# Patient Record
Sex: FEMALE | Race: WHITE | NOT HISPANIC OR LATINO | URBAN - METROPOLITAN AREA
[De-identification: names, ages, dates, MRNs, and addresses within clinical notes are randomized per-mention and may not be internally consistent; named-entity substitution may affect disease eponyms.]

---

## 2019-08-12 ENCOUNTER — OFFICE VISIT - HEALTHEAST (OUTPATIENT)
Dept: ADDICTION MEDICINE | Facility: CLINIC | Age: 59
End: 2019-08-12

## 2019-08-12 ENCOUNTER — AMBULATORY - HEALTHEAST (OUTPATIENT)
Dept: ADDICTION MEDICINE | Facility: CLINIC | Age: 59
End: 2019-08-12

## 2019-08-12 DIAGNOSIS — F10.20 ALCOHOL USE DISORDER, SEVERE, DEPENDENCE (H): ICD-10-CM

## 2019-08-13 ENCOUNTER — OFFICE VISIT - HEALTHEAST (OUTPATIENT)
Dept: ADDICTION MEDICINE | Facility: CLINIC | Age: 59
End: 2019-08-13

## 2019-08-13 DIAGNOSIS — F33.1 MAJOR DEPRESSIVE DISORDER, RECURRENT EPISODE, MODERATE (H): ICD-10-CM

## 2019-08-13 DIAGNOSIS — F10.20 ALCOHOL USE DISORDER, SEVERE, DEPENDENCE (H): ICD-10-CM

## 2019-08-13 DIAGNOSIS — F43.10 POSTTRAUMATIC STRESS DISORDER: ICD-10-CM

## 2019-08-14 ENCOUNTER — OFFICE VISIT - HEALTHEAST (OUTPATIENT)
Dept: ADDICTION MEDICINE | Facility: CLINIC | Age: 59
End: 2019-08-14

## 2019-08-14 DIAGNOSIS — F10.20 ALCOHOL USE DISORDER, SEVERE, DEPENDENCE (H): ICD-10-CM

## 2019-08-16 ENCOUNTER — OFFICE VISIT - HEALTHEAST (OUTPATIENT)
Dept: ADDICTION MEDICINE | Facility: CLINIC | Age: 59
End: 2019-08-16

## 2019-08-16 DIAGNOSIS — F10.20 ALCOHOL USE DISORDER, SEVERE, DEPENDENCE (H): ICD-10-CM

## 2019-08-19 ENCOUNTER — OFFICE VISIT - HEALTHEAST (OUTPATIENT)
Dept: ADDICTION MEDICINE | Facility: CLINIC | Age: 59
End: 2019-08-19

## 2019-08-19 DIAGNOSIS — F10.20 ALCOHOL USE DISORDER, SEVERE, DEPENDENCE (H): ICD-10-CM

## 2019-08-20 ENCOUNTER — AMBULATORY - HEALTHEAST (OUTPATIENT)
Dept: ADDICTION MEDICINE | Facility: CLINIC | Age: 59
End: 2019-08-20

## 2019-08-21 ENCOUNTER — OFFICE VISIT - HEALTHEAST (OUTPATIENT)
Dept: ADDICTION MEDICINE | Facility: CLINIC | Age: 59
End: 2019-08-21

## 2019-08-21 DIAGNOSIS — F10.20 ALCOHOL USE DISORDER, SEVERE, DEPENDENCE (H): ICD-10-CM

## 2019-08-22 ENCOUNTER — AMBULATORY - HEALTHEAST (OUTPATIENT)
Dept: ADDICTION MEDICINE | Facility: CLINIC | Age: 59
End: 2019-08-22

## 2019-08-26 ENCOUNTER — OFFICE VISIT - HEALTHEAST (OUTPATIENT)
Dept: ADDICTION MEDICINE | Facility: CLINIC | Age: 59
End: 2019-08-26

## 2019-08-26 DIAGNOSIS — F10.20 ALCOHOL USE DISORDER, SEVERE, DEPENDENCE (H): ICD-10-CM

## 2019-08-28 ENCOUNTER — OFFICE VISIT - HEALTHEAST (OUTPATIENT)
Dept: ADDICTION MEDICINE | Facility: CLINIC | Age: 59
End: 2019-08-28

## 2019-08-28 DIAGNOSIS — F10.20 ALCOHOL USE DISORDER, SEVERE, DEPENDENCE (H): ICD-10-CM

## 2019-08-30 ENCOUNTER — AMBULATORY - HEALTHEAST (OUTPATIENT)
Dept: ADDICTION MEDICINE | Facility: CLINIC | Age: 59
End: 2019-08-30

## 2019-08-30 ENCOUNTER — OFFICE VISIT - HEALTHEAST (OUTPATIENT)
Dept: ADDICTION MEDICINE | Facility: CLINIC | Age: 59
End: 2019-08-30

## 2019-08-30 DIAGNOSIS — F10.20 ALCOHOL USE DISORDER, SEVERE, DEPENDENCE (H): ICD-10-CM

## 2019-09-05 ENCOUNTER — AMBULATORY - HEALTHEAST (OUTPATIENT)
Dept: ADDICTION MEDICINE | Facility: CLINIC | Age: 59
End: 2019-09-05

## 2019-09-11 ENCOUNTER — OFFICE VISIT - HEALTHEAST (OUTPATIENT)
Dept: ADDICTION MEDICINE | Facility: CLINIC | Age: 59
End: 2019-09-11

## 2019-09-11 DIAGNOSIS — F10.20 ALCOHOL USE DISORDER, SEVERE, DEPENDENCE (H): ICD-10-CM

## 2019-09-12 ENCOUNTER — AMBULATORY - HEALTHEAST (OUTPATIENT)
Dept: ADDICTION MEDICINE | Facility: CLINIC | Age: 59
End: 2019-09-12

## 2019-09-13 ENCOUNTER — OFFICE VISIT - HEALTHEAST (OUTPATIENT)
Dept: ADDICTION MEDICINE | Facility: CLINIC | Age: 59
End: 2019-09-13

## 2019-09-13 DIAGNOSIS — F10.20 ALCOHOL USE DISORDER, SEVERE, DEPENDENCE (H): ICD-10-CM

## 2019-09-16 ENCOUNTER — OFFICE VISIT - HEALTHEAST (OUTPATIENT)
Dept: ADDICTION MEDICINE | Facility: CLINIC | Age: 59
End: 2019-09-16

## 2019-09-16 DIAGNOSIS — F10.20 ALCOHOL USE DISORDER, SEVERE, DEPENDENCE (H): ICD-10-CM

## 2019-09-17 ENCOUNTER — AMBULATORY - HEALTHEAST (OUTPATIENT)
Dept: ADDICTION MEDICINE | Facility: CLINIC | Age: 59
End: 2019-09-17

## 2019-09-18 ENCOUNTER — OFFICE VISIT - HEALTHEAST (OUTPATIENT)
Dept: ADDICTION MEDICINE | Facility: CLINIC | Age: 59
End: 2019-09-18

## 2019-09-18 DIAGNOSIS — F10.20 ALCOHOL USE DISORDER, SEVERE, DEPENDENCE (H): ICD-10-CM

## 2019-09-19 ENCOUNTER — AMBULATORY - HEALTHEAST (OUTPATIENT)
Dept: ADDICTION MEDICINE | Facility: CLINIC | Age: 59
End: 2019-09-19

## 2019-09-23 ENCOUNTER — OFFICE VISIT - HEALTHEAST (OUTPATIENT)
Dept: ADDICTION MEDICINE | Facility: CLINIC | Age: 59
End: 2019-09-23

## 2019-09-23 DIAGNOSIS — F33.1 MAJOR DEPRESSIVE DISORDER, RECURRENT EPISODE, MODERATE (H): ICD-10-CM

## 2019-09-23 DIAGNOSIS — F10.20 ALCOHOL USE DISORDER, SEVERE, DEPENDENCE (H): ICD-10-CM

## 2019-09-25 ENCOUNTER — OFFICE VISIT - HEALTHEAST (OUTPATIENT)
Dept: ADDICTION MEDICINE | Facility: CLINIC | Age: 59
End: 2019-09-25

## 2019-09-25 DIAGNOSIS — F10.20 ALCOHOL USE DISORDER, SEVERE, DEPENDENCE (H): ICD-10-CM

## 2019-09-26 ENCOUNTER — AMBULATORY - HEALTHEAST (OUTPATIENT)
Dept: ADDICTION MEDICINE | Facility: CLINIC | Age: 59
End: 2019-09-26

## 2019-09-27 ENCOUNTER — OFFICE VISIT - HEALTHEAST (OUTPATIENT)
Dept: ADDICTION MEDICINE | Facility: CLINIC | Age: 59
End: 2019-09-27

## 2019-09-27 DIAGNOSIS — F10.20 ALCOHOL USE DISORDER, SEVERE, DEPENDENCE (H): ICD-10-CM

## 2019-10-03 ENCOUNTER — AMBULATORY - HEALTHEAST (OUTPATIENT)
Dept: ADDICTION MEDICINE | Facility: CLINIC | Age: 59
End: 2019-10-03

## 2019-10-09 ENCOUNTER — OFFICE VISIT - HEALTHEAST (OUTPATIENT)
Dept: ADDICTION MEDICINE | Facility: CLINIC | Age: 59
End: 2019-10-09

## 2019-10-09 DIAGNOSIS — F10.20 ALCOHOL USE DISORDER, SEVERE, DEPENDENCE (H): ICD-10-CM

## 2019-10-10 ENCOUNTER — OFFICE VISIT - HEALTHEAST (OUTPATIENT)
Dept: ADDICTION MEDICINE | Facility: CLINIC | Age: 59
End: 2019-10-10

## 2019-10-10 ENCOUNTER — AMBULATORY - HEALTHEAST (OUTPATIENT)
Dept: ADDICTION MEDICINE | Facility: CLINIC | Age: 59
End: 2019-10-10

## 2019-10-10 DIAGNOSIS — F10.20 ALCOHOL USE DISORDER, SEVERE, DEPENDENCE (H): ICD-10-CM

## 2019-10-14 ENCOUNTER — OFFICE VISIT - HEALTHEAST (OUTPATIENT)
Dept: ADDICTION MEDICINE | Facility: CLINIC | Age: 59
End: 2019-10-14

## 2019-10-14 DIAGNOSIS — F10.20 ALCOHOL USE DISORDER, SEVERE, DEPENDENCE (H): ICD-10-CM

## 2019-10-16 ENCOUNTER — OFFICE VISIT - HEALTHEAST (OUTPATIENT)
Dept: ADDICTION MEDICINE | Facility: CLINIC | Age: 59
End: 2019-10-16

## 2019-10-16 DIAGNOSIS — F10.20 ALCOHOL USE DISORDER, SEVERE, DEPENDENCE (H): ICD-10-CM

## 2019-10-17 ENCOUNTER — AMBULATORY - HEALTHEAST (OUTPATIENT)
Dept: ADDICTION MEDICINE | Facility: CLINIC | Age: 59
End: 2019-10-17

## 2019-10-18 ENCOUNTER — OFFICE VISIT - HEALTHEAST (OUTPATIENT)
Dept: ADDICTION MEDICINE | Facility: CLINIC | Age: 59
End: 2019-10-18

## 2019-10-18 DIAGNOSIS — F10.20 ALCOHOL USE DISORDER, SEVERE, DEPENDENCE (H): ICD-10-CM

## 2019-10-24 ENCOUNTER — AMBULATORY - HEALTHEAST (OUTPATIENT)
Dept: ADDICTION MEDICINE | Facility: CLINIC | Age: 59
End: 2019-10-24

## 2019-10-30 ENCOUNTER — OFFICE VISIT - HEALTHEAST (OUTPATIENT)
Dept: ADDICTION MEDICINE | Facility: CLINIC | Age: 59
End: 2019-10-30

## 2019-10-30 DIAGNOSIS — F10.20 ALCOHOL USE DISORDER, SEVERE, DEPENDENCE (H): ICD-10-CM

## 2019-10-31 ENCOUNTER — AMBULATORY - HEALTHEAST (OUTPATIENT)
Dept: ADDICTION MEDICINE | Facility: CLINIC | Age: 59
End: 2019-10-31

## 2019-11-01 ENCOUNTER — OFFICE VISIT - HEALTHEAST (OUTPATIENT)
Dept: ADDICTION MEDICINE | Facility: CLINIC | Age: 59
End: 2019-11-01

## 2019-11-01 DIAGNOSIS — F10.20 ALCOHOL USE DISORDER, SEVERE, DEPENDENCE (H): ICD-10-CM

## 2019-11-07 ENCOUNTER — AMBULATORY - HEALTHEAST (OUTPATIENT)
Dept: ADDICTION MEDICINE | Facility: CLINIC | Age: 59
End: 2019-11-07

## 2019-11-11 ENCOUNTER — AMBULATORY - HEALTHEAST (OUTPATIENT)
Dept: ADDICTION MEDICINE | Facility: CLINIC | Age: 59
End: 2019-11-11

## 2021-05-31 NOTE — PROGRESS NOTES
"Addiction Services - Initial Services Plan     Patient  Name: Terrie Mendez  MRN: [id number]   : 1960  Admit Date: 19       Patient describes their immediate need: To learn recovery skills to prevent relapse. Patient denies all other immediate needs at this time.     Are there any immediate Safety Needs such as (physical, stability, mobility):  Pt is able to get medical care as needed. Pt denies immediate concerns.     Immediate Health Needs and Plan:   Continue care with PCP.   Seek medical attention as necessary.   Continue taking all medications as prescribed.     Vulnerable Adult: No     Issues to be addressed in the first sessions:   Relapse prevention  maintaining stable mental health  Building recovery support    Patient strengths and needs:   Strengths identified as \"I'm outgoing, I have a lot of hobbies, I'm good at being a grandma.\"   Needs identified as \"realtionships, boundaries, my financial situation.\"     Plan for patient for time between intake and completion of the treatment plan:   Attend all group therapy sessions as directed, complete all written and oral assignments as directed, and remain clean and sober. A relapse, if any, must be reported to staff immediately in order to ensure you are receiving the proper level of care. If you cannot attend a group therapy session you must call contact information provided in intake folder and leave a message before or during group hours.       Vulnerable Adult Review   [X] Review of the Facility Abuse Prevention plan was reviewed with the patient   [X] No Individual Abuse Plan is necessary   [ ] In addition to the Facility Abuse Prevention plan, an Individual Abuse Plan will be put in place       Staff Name/Title: DINORA Ortiz   Date: 2019  Time: 10:12 AM        "

## 2021-05-31 NOTE — PROGRESS NOTES
Terrie Mendez attended 3 hours of group today.     The group topic was Stress Management, patient was responsive to topic.     Patients engagement in the group session: medium     Total group size: 10      DINORA Gardner  8/16/2019, 3:01 PM

## 2021-05-31 NOTE — PROGRESS NOTES
Terrie Mendez attended 3 hours of group therapy on 8/21/2019    The group topic was Stress Management, patient was responsive to topic.     Patients engagement in the group session: medium     Total group size: 7      DINORA Armenta , 8/21/2019 , 12:08 PM

## 2021-05-31 NOTE — PROGRESS NOTES
Terrie Mendez attended 3 hours of group today.     The group topic was Dual Diagnosis I, patient was responsive to topic.     Patients engagement in the group session: high     Total group size: 6      BRITTANI Rowe, [facility]  8/26/2019, 3:29 PM

## 2021-05-31 NOTE — PROGRESS NOTES
Terrie Mendez attended 3 hours of group today.     The group topic was Dual Diagnosis I, patient was responsive to topic.     Patients engagement in the group session: high     Total group size: 6      Vita Delgado  8/28/2019, 1:34 PM

## 2021-05-31 NOTE — PROGRESS NOTES
Terrie Mendez attended 3 hours of group today.     The group topic was Stress Management, patient was responsive to topic.     Patients engagement in the group session: high     Total group size: 14 group was split      DINORA Armenta  8/19/2019, 12:07 PM

## 2021-05-31 NOTE — PROGRESS NOTES
"Intake Note:     D) Terrie Mendez is a 58 y.o.   White or  female who is referred to [facility] via [facility] with funding from [facility]. Patient orientated x 3. Patient meets criteria for Alcohol Use Disorder, severe (F10.20).  Patient appears appropriate for [facility].   A) Met with patient for 55 minutes.  Completed intake assessment and preliminary paperwork. Patient was given and explained counselor & supervisor license number and contact info, Patient Bill of Rights, program rules/regulations, Program Abuse Prevention Plan, confidentiality & HIPPA policies, grievance procedure, presented ROIs, TB & HIV/AIDS policies & resources, and Vulnerable Adult policy.   Conducted Vulnerable Adult Assessment.   R)No special Vulnerable Adult needed at this time.  Patient signed and agreed to counselor & supervisor license number and contact info, Patient Bill of Rights, group rules/regulations, Program Abuse Prevention Plan, confidentiality & HIPPA policies, grievance procedure,  ROIs, TB & HIV/AIDS policies & resources, and Vulnerable Adult policy. Patient scored Low Risk on C-SSRS screen. Patient denied suicidal ideation/intent/plan/means at this time.     Opioid Use Disorder: No   Provided \"Options for Opioid Treatment in [place] and Overdose Prevention\" No     Dimension #1 - Withdrawal Potential - Risk 0. Patient reports binge drinking on alcohol, with her last reported use being on 8/5/19. She denies any current withdrawal symptoms or concerns. Patient shows no physical signs or symptoms of intoxication or withdrawal. She does not appear to be at risk of withdrawal at this time. Patient is oriented x4.     Dimension #2 - Biomedical - Risk 0. Patient denies any current medical issues or concerns. She endorses having a PCP whom she sees for her healthcare needs and concerns. Patient appears able to get her medical needs addressed as necessary. She appears to be in adequate physical health and functioning " at this time.     Dimension #3 - Emotional , Behavioral and Cognitive - Risk 2. Patient reports mental health diagnoses of depression, anxiety, and PTSD. She reports medication to help manage her symptoms, however reports she does not consistently take it when she is drinking. Patient reports that she does currently have case management services as well as therapy. Patient denies any current suicidal or homicidal thoughts or plans. Patient is oriented x4.      Dimension #4 - Readiness for Change - Risk 0. Patient reports she is here for treatment on her own regard. Patient verbalizes that she feels she has a problem, and that she has a desire to change. Patient verbalizes willingness to follow treatment recommendations. She appears genuine in her motivation for treatment at this time. Patient was calm, cooperative, and appropriately behaved throughout this appointment.     Dimension #5 - Relapse Potential - Risk 3. Patient reports some treatment experience, and so appears to have some knowledge of addiction and recovery. She appears to lack consistency in using healthy coping skills as evidence by her binge use of alcohol. Patient's mental and lack of support appear to indicate an increased risk of relapse at this time.     Dimension #6 - Recovery Environment - Risk 3. Patient is currently unemployed, and so lack structured activity for her daily life. She was able to identify hobbies she enjoys, however does not appear to engage in those things when she is drinking. Patient reports a stable and supportive place to live currently. She reports that she lacks support for her sobriety as most of her family drinks, and she doesn't have supportive friends at this time. Patient denies any current legal involvement.     T) Explained counselor & supervisor license number and contact info, Patient Bill of Rights, program rules/regulations, Program Abuse Prevention Plan, confidentiality & HIPPA policies, grievance procedure,  presented ROIs, TB & HIV/AIDS policies & resources, and Vulnerable Adult policy. Patient expected to start group following the completion of her diagnostic assessment on 8/13/19.      DINORA Ortiz  8/12/2019, 10:12 AM

## 2021-05-31 NOTE — PROGRESS NOTES
Terrie Mendez attended 2 hours of group today.     The group topic was Stress Management, patient was responsive to topic.     Patients engagement in the group session: medium     Total group size: 11      DINORA Armenta  8/14/2019, 11:00 AM

## 2021-05-31 NOTE — PROGRESS NOTES
Weekly Progress Note  Terrie Mendez  1960  [id number]      D) Pt attended 2 of 3 groups this week with 1 absences. Patient attended 0 individual sessions this week. A) Staff facilitated groups and reviewed tx progress. Assessed for [facility]. R) No VAP needed at this time.     Any significant events, defines as events that impact patients relationship with others inside and outside of treatment: No  Indicate any changes or monitoring of physical or mental health problems: No  Indicate involvement by any outside supports: No  IAPP reviewed and modified as needed: NA    Pt working on the following dimensions:  Dimension #1 - Withdrawal Potential - Risk 0. Patient reports binge drinking on alcohol, with her last reported use being on 8/5/19. She denies any current withdrawal symptoms or concerns. Patient shows no physical signs or symptoms of intoxication or withdrawal. She does not appear to be at risk of withdrawal at this time. Patient is oriented x4.   Specific goals from treatment plan addressed this week:  Patient reports abstinence this week. No withdrawal symptoms observed or reported.   Effectiveness of strategies:  Strategies are effective.   Dimension #2 - Biomedical - Risk 0. Patient denies any current medical issues or concerns. She endorses having a PCP whom she sees for her healthcare needs and concerns. Patient appears able to get her medical needs addressed as necessary. She appears to be in adequate physical health and functioning at this time.   Specific goals from treatment plan addressed this week:  Patient did not report any major medical concerns this week.   Effectiveness of strategies:  Strategies are effective.   Dimension #3 - Emotional/Behavioral/Cognitive - Risk 2. Patient reports mental health diagnoses of depression, anxiety, and PTSD. Patient completed Brief DA with KATHERIN Gardner LADC at onset of the current treatment episode and was diagnosed with major depressive disorder,  recurrent, moderate and PTSD. She reports medication to help manage her symptoms, however reports she does not consistently take it when she is drinking. Patient reports that she does currently have case management services through the [facility] as well as therapy. Patient denies any current suicidal or homicidal thoughts or plans.  Active interventions to stabilize mental health symptoms:  Meds, psychotherapy  Specific goals from treatment plan addressed this week:  Patient reports stable mood throughout the week. She did idenitfy some concern regarding her sister's health and was able to recognize that she cannot make her sister address these concerns. Patient reports medication compliance. Denies SI/HI/SIB. Patient repots active skill use this week including walking, talking to others, eating good food, and being active.   Effectiveness of strategies:  Strategies are effective.   Dimension #4 - Readiness for Change - Risk 0. Patient reports she is here for treatment on her own regard. Patient verbalizes that she feels she has a problem, and that she has a desire to change. Patient verbalizes willingness to follow treatment recommendations. She appears genuine in her motivation for treatment at this time. She is blending well with peers and has connections with other women her age in the group.   Specific goals from treatment plan addressed this week:  Patient attended 2/3 groups this week, absence was excused. No individual sessions were scheduled, however, writer and patient did schedule an individual session for next week. Patient was active in group this week, openly sharing as well as offering constructive feedback to group members. Patient made connections between her mental health and addiction in group. Patient continues to report a desire to be abstinent.   Effectiveness of strategies:  Strategies are effective.  Dimension #5 - Relapse Potential - Risk 3. Patient reports some treatment experience, and so  appears to have some knowledge of addiction and recovery. She appears to lack consistency in using healthy coping skills as evidence by her binge use of alcohol. Patient's mental and lack of support appear to indicate an increased risk of relapse at this time.   Specific goals from treatment plan addressed this week:  Patient reports no substance use this week. Patient denies presence of urges.   Effectiveness of strategies:  Strategies are effective.   Dimension #6 - Recovery Environment - Risk 3. Patient is currently unemployed, and so lack structured activity for her daily life. She was able to identify hobbies she enjoys, however does not appear to engage in those things when she is drinking. Patient reports a stable and supportive place to live currently. She reports that she lacks support for her sobriety as most of her family drinks, and she doesn't have supportive friends at this time. Patient denies any current legal involvement.   Specific goals from treatment plan addressed this week:  Patient reports wanting to attend a community education class this week. Patient reports spending time with her sister over the week and going to a show at the [employer].   Effectiveness of strategies:  Strategies are effective.   T) Treatment plan updated: no.  Patient notified and in agreement: NA.  Patient educated on Dual Diagnosis I. Patient has completed 19 of 90 program hours at this time. Patient is currently in phase 1. Projected discharge date is 11/13/29. Current discharge plan is Phase 3.     BRITTANI Rowe, [facility]  8/30/2019, 7:52 AM       Weekly Educational Topics Date   1. Dual Diagnoses, week 1 8/26, 8/28   2. Dual Diagnoses, week 2    3. Stress Management 8/14,16,19,21   4. Feelings/Emotions    5. Thinking    6. Change    7. Recovery Support    8. Relationships/Communication    9. Addiction 101    10. Relapse Prevention    11. Grief and Loss    12. Strengths    13. Wellness

## 2021-05-31 NOTE — PROGRESS NOTES
Weekly Progress Note - LATE  Terrie Mendez  1960  [id number]      D) Pt attended 2 groups this week with 0 absences. Patient attended 1 individual session for DA this week. A) Staff facilitated groups and reviewed tx progress. Assessed for [facility]. R) No VAP needed at this time.     Any significant events, defines as events that impact patients relationship with others inside and outside of treatment: No  Indicate any changes or monitoring of physical or mental health problems: No  Indicate involvement by any outside supports: No  IAPP reviewed and modified as needed: NA    Pt working on the following dimensions:  Dimension #1 - Withdrawal Potential - Risk 0. Patient reports binge drinking on alcohol, with her last reported use being on 8/5/19. She denies any current withdrawal symptoms or concerns. Patient shows no physical signs or symptoms of intoxication or withdrawal. She does not appear to be at risk of withdrawal at this time. Patient is oriented x4. .  Specific goals from treatment plan addressed this week:  Patient to maintain abstinence throughout outpatient treatment.    Patient to report any substance/alcohol use to counselor to determine if any changes need to be made to address withdrawal symptoms.   Patient to complete any requested UAs.   Effectiveness of strategies:  Emerging    Dimension #2 - Biomedical - Risk 0. Patient denies any current medical issues or concerns. She endorses having a PCP whom she sees for her healthcare needs and concerns. Patient appears able to get her medical needs addressed as necessary. She appears to be in adequate physical health and functioning at this time.   Specific goals from treatment plan addressed this week:  Patient to maintain stable health throughout outpatient treatment.   Patient to report any changes to physical health to counselor.    Patient to attend all scheduled doctor s appointments.    Patient to take medications as prescribed.   Effectiveness of  strategies:  Good    Dimension #3 - Emotional/Behavioral/Cognitive - Risk 2. Patient reports mental health diagnoses of depression, anxiety, and PTSD. She reports medication to help manage her symptoms, however reports she does not consistently take it when she is drinking. Patient reports that she does currently have case management services as well as therapy. Patient denies any current suicidal or homicidal thoughts or plans..  Active interventions to stabilize mental health symptoms:  none  Specific goals from treatment plan addressed this week:  Patient to maintain stable mental health.   Patient to begin using coping skills learned in therapeutic group (such as grounding, breathing, thought challenging, mindfulness, etc), and share in daily check-in any benefits or challenges that you experience using these skills.   Patient to meet with counselor or other  staff to complete a diagnostic assessment for mental health. Patient to discuss with primary counselor potential referrals.   Patient to attend all scheduled appointments with therapist.   Patient to continue working with , as appropriate.     Effectiveness of strategies:  Emerging    Dimension #4 - Readiness for Change - Risk 0. Patient reports she is here for treatment on her own regard. Patient verbalizes that she feels she has a problem, and that she has a desire to change. Patient verbalizes willingness to follow treatment recommendations. She appears genuine in her motivation for treatment at this time. Patient was calm, cooperative, and appropriately behaved throughout this appointment.      Specific goals from treatment plan addressed this week:  Patient to increase motivation towards recovery by participating in outpatient programming.    Patient will identify weekly and daily goals to help increase motivation and engagement in recovery.  Effectiveness of strategies:  Emerging    Dimension #5 - Relapse Potential - Risk 3. Patient  reports some treatment experience, and so appears to have some knowledge of addiction and recovery. She appears to lack consistency in using healthy coping skills as evidence by her binge use of alcohol. Patient's mental and lack of support appear to indicate an increased risk of relapse at this time.   Specific goals from treatment plan addressed this week:   Patient to gain skills to help prevent relapse.   Patient to share in daily check-in any urges and addictive thinking to better understand her pattern of use and to prevent relapse in the future.     Effectiveness of strategies:  Emerging    Dimension #6 - Recovery Environment - Risk 3. Patient is currently unemployed, and so lack structured activity for her daily life. She was able to identify hobbies she enjoys, however does not appear to engage in those things when she is drinking. Patient reports a stable and supportive place to live currently. She reports that she lacks support for her sobriety as most of her family drinks, and she doesn't have supportive friends at this time. Patient denies any current legal involvement.      Specific goals from treatment plan addressed this week:  Patient to expand sober support and increase time spent in activities that will promote recovery  Patient to develop weekly schedule of activities, and include other activities that you think will benefit your recovery. Examples could be scheduled time with family, working out, or other activities that you find enjoyable.   Patient to reach out to supportive family and friends at least once per week, share progress in daily check-ins and 1:1s.  Effectiveness of strategies:  Emerging    T) Treatment plan updated: no.  Patient notified and in agreement: NA.  Patient educated on Stress Management. Patient has completed 7 of 90 program hours at this time. Patient is currently in phase 1. Projected discharge date is 11/13/29. Current discharge plan is Phase 3.     Tanvi Guardado  [facility]  8/20/2019, 12:12 PM       Weekly Educational Topics Date   1. Dual Diagnoses, week 1    2. Dual Diagnoses, week 2    3. Stress Management 8/14,16   4. Feelings/Emotions    5. Thinking    6. Change    7. Recovery Support    8. Relationships/Communication    9. Addiction 101    10. Relapse Prevention    11. Grief and Loss    12. Strengths    13. Wellness

## 2021-05-31 NOTE — PROGRESS NOTES
Individual Treatment Plan    Patient  Name: Terrie Mendez  MRN: [id number]   : 1960  Admit Date: 19  Date of Initial Service Plan: 19  Tentative Discharge Date: 19    Diagnosis: Alcohol Use Disorder, severe (F10.20)    Counselor: DINORA Ortiz      Dimension 1: Acute Intoxication/Withdrawal Potential, Risk level: 0    Problem Statement from Comprehensive Assessment: 2019:  Patient reports binge drinking on alcohol, with her last reported use being on 19. She denies any current withdrawal symptoms or concerns. Patient shows no physical signs or symptoms of intoxication or withdrawal. She does not appear to be at risk of withdrawal at this time. Patient is oriented x4.     Problem: Patient's last use of alcohol on 19.  Goal: Patient to maintain abstinence throughout outpatient treatment.   Must be reached to complete treatment? Yes  Methods/Strategies (must include amount and frequency):   1. Patient to report any substance/alcohol use to counselor to determine if any changes need to be made to address withdrawal symptoms.   2. Patient to complete any requested UAs.   Target Date: 19  Completion Date:     Dimension 2: Biomedical Conditions/Complications, Risk level: 0    Problem Statement from Comprehensive Assessment: 2019:  Patient denies any current medical issues or concerns. She endorses having a PCP whom she sees for her healthcare needs and concerns. Patient appears able to get her medical needs addressed as necessary. She appears to be in adequate physical health and functioning at this time.     Problem: Patient denies health concerns.   Goal: Patient to maintain stable health throughout outpatient treatment.   Must be reached to complete treatment? No  Methods/Strategies (must include amount and frequency):   1. Patient to report any changes to physical health to counselor.   2. Patient to attend all scheduled doctor s appointments.   3. Patient to  take medications as prescribed.   Target Date: 11/22/19  Completion Date:     Problem: Patient reports current tobacco use.   Goal: Patient to receive information about smoking cessation.   Must be reached to complete treatment? No  Methods/Strategies (must include amount and frequency):   1. Staff to provide patient with nicotine cessation information and help on how to quit use.   2. Patient to report any progress on stopping nicotine use to staff.   Target Date: 11/22/19  Completion Date:     Dimension 3: Emotional/Behavioral/Cognitive, Risk level: 2    Problem Statement from Comprehensive Assessment: 8/12/2019:  Patient reports mental health diagnoses of depression, anxiety, and PTSD. She reports medication to help manage her symptoms, however reports she does not consistently take it when she is drinking. Patient reports that she does currently have case management services as well as therapy. Patient denies any current suicidal or homicidal thoughts or plans. Patient is oriented x4.    Problem: Patient reports mental health diagnoses of depression, anxiety, and PTSD.   Goal: Patient to maintain stable mental health.   Must be reached to complete treatment? Yes  Methods/Strategies (must include amount and frequency):   1. Patient to begin using coping skills learned in therapeutic group (such as grounding, breathing, thought challenging, mindfulness, etc), and share in daily check-in any benefits or challenges that you experience using these skills.  2. Patient to meet with counselor or other  staff to complete a diagnostic assessment for mental health. Patient to discuss with primary counselor potential referrals.  3. Patient to attend all scheduled appointments with therapist.   4. Patient to continue working with , as appropriate.   Target Date: 11/22/19  Completion Date:     Dimension 4: Readiness to Change, Risk level 0    Problem Statement from Comprehensive Assessment: 8/12/2019:  Patient  reports she is here for treatment on her own regard. Patient verbalizes that she feels she has a problem, and that she has a desire to change. Patient verbalizes willingness to follow treatment recommendations. She appears genuine in her motivation for treatment at this time. Patient was calm, cooperative, and appropriately behaved throughout this appointment.     Problem: Patient verbalizes motivation for treatment and sobriety.   Goal: Patient to increase motivation towards recovery by participating in outpatient programming.   Must be reached to complete treatment? Yes  Methods/Strategies (must include amount and frequency):   1. Patient to attend 3, 3-hour groups per week and all scheduled 1:1s.  2. Patient will contact staff if unable to attend ([facility] [phone], [facility] [phone]).  3. Patient will identify weekly and daily goals to help increase motivation and engagement in recovery.  Target Date: 11/22/19  Completion Date:     Dimension 5: Relapse/Continued Use/Continued Problem Potential, Risk level: 3    Problem Statement from Comprehensive Assessment: 8/12/2019:  Patient reports some treatment experience, and so appears to have some knowledge of addiction and recovery. She appears to lack consistency in using healthy coping skills as evidence by her binge use of alcohol. Patient's mental and lack of support appear to indicate an increased risk of relapse at this time.     Problem: Patient lacks skills to maintain sobriety.   Goal: Patient to gain skills to help prevent relapse.   Must be reached to complete treatment? Yes  Methods/Strategies (must include amount and frequency):   1. Patient to share in daily check-in any urges and addictive thinking to better understand her pattern of use and to prevent relapse in the future.   2. Patient to identify at least 5 personal triggers and high-risk situations for relapse.  3. Patient to learn and implement at least 5 new personal coping strategies to  manage urges to use.  4. Patient to identify 5 needs that were met by substance use, and 5 things she can do to meet those needs without substance use.   Target Date: 11/22/19  Completion Date:     Dimension 6: Recovery Environment, Risk level: 3    Problem Statement from Comprehensive Assessment: 8/12/2019:  Patient is currently unemployed, and so lack structured activity for her daily life. She was able to identify hobbies she enjoys, however does not appear to engage in those things when she is drinking. Patient reports a stable and supportive place to live currently. She reports that she lacks support for her sobriety as most of her family drinks, and she doesn't have supportive friends at this time. Patient denies any current legal involvement.     Problem: Lack of sober support   Goal: Patient to expand sober support and increase time spent in activities that will promote recovery.  Must be reached to complete treatment? yes  Methods/Strategies (must include amount and frequency):   1. Explore and identify sober support meetings to regularly attend.   2. Patient to develop weekly schedule of activities, and include other activities that you think will benefit your recovery. Examples could be scheduled time with family, working out, or other activities that you find enjoyable.  3. Patient to reach out to supportive family and friends at least once per week, share progress in daily check-ins and 1:1s.  Target Date: 11/22/19  Completion Date:       Resources  Resources to which the patient is being referred for problems when problems are to be addressed concurrently by another provider: therapist, , PCP, and all other providers as determined throughout treatment.         By signing this document, I am acknowledging that I was actively and directly involved in the development of my treatment plan.         Patient Signature:_____________________________            Date:_____________     Staff Signature:    Nayeli Willingham                                                          Date: 8/12/2019, 11:06 AM

## 2021-05-31 NOTE — PROGRESS NOTES
Weekly Progress Note  Terrie Mendez  1960  [id number]      D) Pt attended 2 of 2 groups this week . Patient attended 0 individual sessions. A) Staff facilitated groups and reviewed tx progress. [facility] for [facility]. R) No VAP needed at this time.     Any significant events, defines as events that impact patients relationship with others inside and outside of treatment: No  Indicate any changes or monitoring of physical or mental health problems: No  Indicate involvement by any outside supports: No  IAPP reviewed and modified as needed: NA    Pt working on the following dimensions:  Dimension #1 - Withdrawal Potential - Risk 0. Patient reports binge drinking on alcohol, with her last reported use being on 8/5/19. She denies any current withdrawal symptoms or concerns. Patient shows no physical signs or symptoms of intoxication or withdrawal. She does not appear to be at risk of withdrawal at this time.   Specific goals from treatment plan addressed this week:  Patient to maintain abstinence throughout outpatient treatment.    Patient to report any substance/alcohol use to counselor to determine if any changes need to be made to address withdrawal symptoms.   Patient reports no relapse and no Urges this week.   Effectiveness of strategies:  Emerging    Dimension #2 - Biomedical - Risk 0. Patient denies any current medical issues or concerns. She endorses having a PCP whom she sees for her healthcare needs and concerns. Patient appears able to get her medical needs addressed as necessary. She appears to be in adequate physical health and functioning at this time.   Specific goals from treatment plan addressed this week:  Patient to maintain stable health throughout outpatient treatment.   Patient to report any changes to physical health to counselor.    Patient to attend all scheduled doctor s appointments.    Patient to take medications as prescribed.   Effectiveness of strategies:  Good    Dimension #3 -  Emotional/Behavioral/Cognitive - Risk 2. Patient reports mental health diagnoses of depression, anxiety, and PTSD. She reports medication to help manage her symptoms, however reports she does not consistently take it when she is drinking. Patient reports that she does currently have case management services through the [facility] as well as therapy. Patient denies any current suicidal or homicidal thoughts or plans..  Patient appears upbeat and hopeful at this time.  She states that she is going to spend 'girl-time' with her sister this week end and is planning on going to a [employer].  Good positive activities to ensure she does not isolate  Active interventions to stabilize mental health symptoms:  none  Specific goals from treatment plan addressed this week:  Patient to maintain stable mental health.   Patient to begin using coping skills learned in therapeutic group (such as grounding, breathing, thought challenging, mindfulness, etc), and share in daily check-in any benefits or challenges that you experience using these skills.   Patient to meet with counselor or other  staff to complete a diagnostic assessment for mental health. Patient to discuss with primary counselor potential referrals.   Patient to attend all scheduled appointments with therapist.   Patient to continue working with , as appropriate.     Effectiveness of strategies:  good    Dimension #4 - Readiness for Change - Risk 0. Patient reports she is here for treatment on her own regard. Patient verbalizes that she feels she has a problem, and that she has a desire to change. Patient verbalizes willingness to follow treatment recommendations. She appears genuine in her motivation for treatment at this time. She is blending well with peers and has connections with other women her age in the group.      Specific goals from treatment plan addressed this week:  Patient to increase motivation towards recovery by participating in  outpatient programming.    Patient will identify weekly and daily goals to help increase motivation and engagement in recovery.  Effectiveness of strategies:  good    Dimension #5 - Relapse Potential - Risk 3. Patient reports some treatment experience, and so appears to have some knowledge of addiction and recovery. She appears to lack consistency in using healthy coping skills as evidence by her binge use of alcohol. Patient's mental and lack of support appear to indicate an increased risk of relapse at this time.   Specific goals from treatment plan addressed this week:   Patient to gain skills to help prevent relapse.   Patient to share in daily check-in any urges and addictive thinking to better understand her pattern of use and to prevent relapse in the future.     Effectiveness of strategies:  Emerging    Dimension #6 - Recovery Environment - Risk 3. Patient is currently unemployed, and so lack structured activity for her daily life. She was able to identify hobbies she enjoys, however does not appear to engage in those things when she is drinking. Patient reports a stable and supportive place to live currently. She reports that she lacks support for her sobriety as most of her family drinks, and she doesn't have supportive friends at this time. Patient denies any current legal involvement.      Specific goals from treatment plan addressed this week:  Patient to expand sober support and increase time spent in activities that will promote recovery  Patient to develop weekly schedule of activities, and include other activities that you think will benefit your recovery. Examples could be scheduled time with family, working out, or other activities that you find enjoyable.   Patient to reach out to supportive family and friends at least once per week, share progress in daily check-ins and 1:1s.  Effectiveness of strategies:  Emerging    T) Treatment plan updated: no.  Patient notified and in agreement:  NA.  Patient educated on Stress Management. Patient has completed 13 of 90 program hours at this time. Patient is currently in phase 1. Projected discharge date is 11/13/29. Current discharge plan is Phase 3.     DINORA Armenta  8/22/2019, 11:39 AM       Weekly Educational Topics Date   1. Dual Diagnoses, week 1    2. Dual Diagnoses, week 2    3. Stress Management 8/14,16,19,21   4. Feelings/Emotions    5. Thinking    6. Change    7. Recovery Support    8. Relationships/Communication    9. Addiction 101    10. Relapse Prevention    11. Grief and Loss    12. Strengths    13. Wellness

## 2021-05-31 NOTE — PROGRESS NOTES
Terrie Mendez attended 3 hours of group today.     The group topic was Dual Diagnosis I, patient was responsive to topic.     Patients engagement in the group session: medium     Total group size: 7      DINORA Armenta  8/30/2019, 12:23 PM

## 2021-06-01 NOTE — PROGRESS NOTES
Weekly Progress Note  Terrie Mendez  1960  [id number]      D) Pt attended 1 of 3 groups this week with 2 absences. Patient attended 0 individual sessions this week. A) Staff facilitated groups and reviewed tx progress. Ass[facility] for [facility]. R) No VAP needed at this time.     Any significant events, defines as events that impact patients relationship with others inside and outside of treatment: No  Indicate any changes or monitoring of physical or mental health problems: No  Indicate involvement by any outside supports: No  IAPP reviewed and modified as needed: NA    Pt working on the following dimensions:  Dimension #1 - Withdrawal Potential - Risk 0. Patient reports binge drinking on alcohol, with her last reported use being on 8/5/19, at time of intake. Patient reports one time alcohol use the weekend of 9/6/19.  She denies any current withdrawal symptoms or concerns. Patient shows no physical signs or symptoms of intoxication or withdrawal. She does not appear to be at risk of withdrawal at this time. Patient is oriented x4.   Specific goals from treatment plan addressed this week:  Patient reports abstinence this week. No withdrawal symptoms observed or reported.   Effectiveness of strategies:  Strategies are effective.   Dimension #2 - Biomedical - Risk 0. Patient denies any current medical issues or concerns. She endorses having a PCP whom she sees for her healthcare needs and concerns. Patient appears able to get her medical needs addressed as necessary. She appears to be in adequate physical health and functioning at this time.   Specific goals from treatment plan addressed this week:  Patient reports being sick on Monday. She spoke with writer via phone and had concerns about her breathing and thought she might have COPD. Patient went to her PCP on Tuesday and states she was diagnosed with COPD. She states she is thinking about quitting smoking.   Effectiveness of strategies:  Strategies are effective.    Dimension #3 - Emotional/Behavioral/Cognitive - Risk 2. Patient reports mental health diagnoses of depression, anxiety, and PTSD. Patient completed Brief DA with KATHERIN Gardner LADC at onset of the current treatment episode and was diagnosed with major depressive disorder, recurrent, moderate and PTSD. She reports medication to help manage her symptoms, however reports she does not consistently take it when she is drinking. Patient reports that she does currently have case management services through the [facility] as well as therapy. Patient denies any current suicidal or homicidal thoughts or plans.  Active interventions to stabilize mental health symptoms:  Meds, psychotherapy  Specific goals from treatment plan addressed this week:  Patient reports stable mood throughout the week. Patient reports medication compliance. Denies SI/HI/SIB. Patient reports feeling lonely over the weekend. She states she has been using mindfulness and is finding it helpful in helping her remain present.   Effectiveness of strategies:  Strategies are effective.   Dimension #4 - Readiness for Change - Risk 0. Patient reports she is here for treatment on her own regard. Patient verbalizes that she feels she has a problem, and that she has a desire to change. Patient verbalizes willingness to follow treatment recommendations. She appears genuine in her motivation for treatment at this time. She is blending well with peers and has connections with other women her age in the group.   Specific goals from treatment plan addressed this week:  Patient attended 1/2 groups this week, absence was excused. Individual session scheduled for 9/4, however, patient did not attend group due to recovering from recent relapse.   Effectiveness of strategies:  Strategies are effective.  Dimension #5 - Relapse Potential - Risk 3. Patient reports some treatment experience, and so appears to have some knowledge of addiction and recovery. She appears to lack  consistency in using healthy coping skills as evidence by her binge use of alcohol. Patient's mental and lack of support appear to indicate an increased risk of relapse at this time. Patient reports one time alcohol use the weekend of 9/6/19.   Specific goals from treatment plan addressed this week:  Writer talked about her relapse in group. She denies any use since and further denies any urges or cravings. She reports that bored and loneliness were factors in her relapse.   Effectiveness of strategies:  Strategies are effective.   Dimension #6 - Recovery Environment - Risk 3. Patient is currently unemployed, and so lack structured activity for her daily life. She was able to identify hobbies she enjoys, however does not appear to engage in those things when she is drinking. Patient reports a stable and supportive place to live currently. She reports that she lacks support for her sobriety as most of her family drinks, and she doesn't have supportive friends at this time. Patient denies any current legal involvement.   Specific goals from treatment plan addressed this week:  Patient reports working on setting boundaries with her sister after she said some hurtful things after patient disclosed her recent relapse.   Effectiveness of strategies:  Strategies are effective.   T) Treatment plan updated: no.  Patient notified and in agreement: NA.  Patient educated on Feelings/Emotions. Patient has completed 25 of 90 program hours at this time. Patient is currently in phase 1. Projected discharge date is 11/13/29. Current discharge plan is Phase 3.     BRITTANI Rowe, [facility] [place]  9/12/2019, 3:10 PM       Weekly Educational Topics Date   1. Dual Diagnoses, week 1 8/26, 8/28, 8/30   2. Dual Diagnoses, week 2    3. Stress Management 8/14,16,19,21   4. Feelings/Emotions 9/11   5. Thinking    6. Change    7. Recovery Support    8. Relationships/Communication    9. Addiction 101    10. Relapse Prevention    11. Grief and Loss     12. Strengths    13. Wellness

## 2021-06-01 NOTE — PROGRESS NOTES
Terrie Mendez attended 3 hours of group today.     The group topic was Feelings/Emotions, patient was responsive to topic.     Patients engagement in the group session: high     Total group size: 5      [facility] Delgado  9/11/2019, 1:46 PM

## 2021-06-01 NOTE — PROGRESS NOTES
Individual Treatment Plan Update--19    Patient  Name: Terrie Mendez  MRN: [id number]   : 1960  Admit Date: 19  Date of Initial Service Plan: 19  Tentative Discharge Date: 19    Diagnosis: Alcohol Use Disorder, severe (F10.20)    Counselor: Vita Delgado, BRITTANI, DINORA      Dimension 1: Acute Intoxication/Withdrawal Potential, Risk level: 0    Problem Statement from Comprehensive Assessment: 2019:  Patient reports binge drinking on alcohol, with her last reported use being on 19. She denies any current withdrawal symptoms or concerns. Patient shows no physical signs or symptoms of intoxication or withdrawal. She does not appear to be at risk of withdrawal at this time. Patient is oriented x4.     Problem: Patient's last use of alcohol on 19.  Goal: Patient to maintain abstinence throughout outpatient treatment.   Must be reached to complete treatment? Yes  Methods/Strategies (must include amount and frequency):   1. Patient to report any substance/alcohol use to counselor to determine if any changes need to be made to address withdrawal symptoms.   2. Patient to complete any requested UAs.   Target Date: 19  Completion Date:     Dimension 2: Biomedical Conditions/Complications, Risk level: 0    Problem Statement from Comprehensive Assessment: 2019:  Patient denies any current medical issues or concerns. She endorses having a PCP whom she sees for her healthcare needs and concerns. Patient appears able to get her medical needs addressed as necessary. She appears to be in adequate physical health and functioning at this time.     Problem: Patient denies health concerns.   Goal: Patient to maintain stable health throughout outpatient treatment.   Must be reached to complete treatment? No  Methods/Strategies (must include amount and frequency):   1. Patient to report any changes to physical health to counselor.   2. Patient to attend all scheduled doctor s  appointments.   3. Patient to take medications as prescribed.   Target Date: 11/22/19  Completion Date:     Problem: Patient reports current tobacco use.   Goal: Patient to receive information about smoking cessation.   Must be reached to complete treatment? No  Methods/Strategies (must include amount and frequency):   1. Staff to provide patient with nicotine cessation information and help on how to quit use.   2. Patient to report any progress on stopping nicotine use to staff.   Target Date: 11/22/19  Completion Date:     Dimension 3: Emotional/Behavioral/Cognitive, Risk level: 2    Problem Statement from Comprehensive Assessment: 8/12/2019:  Patient reports mental health diagnoses of depression, anxiety, and PTSD. She reports medication to help manage her symptoms, however reports she does not consistently take it when she is drinking. Patient reports that she does currently have case management services as well as therapy. Patient denies any current suicidal or homicidal thoughts or plans. Patient is oriented x4.    Problem: Patient reports mental health diagnoses of depression, anxiety, and PTSD.   Goal: Patient to maintain stable mental health.   Must be reached to complete treatment? Yes  Methods/Strategies (must include amount and frequency):   1. Patient to begin using coping skills learned in therapeutic group (such as grounding, breathing, thought challenging, mindfulness, etc), and share in daily check-in any benefits or challenges that you experience using these skills.  Target Date: 11/22/19  Completion Date:   2. Patient to meet with counselor or other  staff to complete a diagnostic assessment for mental health. Patient to discuss with primary counselor potential referrals.  Target Date: 11/22/19  Completion Date: 9/17/19  3. Patient to attend all scheduled appointments with therapist.   4. Patient to continue working with , as appropriate.   Target Date: 11/22/19  Completion Date:      Dimension 4: Readiness to Change, Risk level 0    Problem Statement from Comprehensive Assessment: 8/12/2019:  Patient reports she is here for treatment on her own regard. Patient verbalizes that she feels she has a problem, and that she has a desire to change. Patient verbalizes willingness to follow treatment recommendations. She appears genuine in her motivation for treatment at this time. Patient was calm, cooperative, and appropriately behaved throughout this appointment.     Problem: Patient verbalizes motivation for treatment and sobriety.   Goal: Patient to increase motivation towards recovery by participating in outpatient programming.   Must be reached to complete treatment? Yes  Methods/Strategies (must include amount and frequency):   1. Patient to attend 3, 3-hour groups per week and all scheduled 1:1s.  2. Patient will contact staff if unable to attend (Vita [phone], Yue [phone]).  3. Patient will identify weekly and daily goals to help increase motivation and engagement in recovery.  Target Date: 11/22/19  Completion Date:     Dimension 5: Relapse/Continued Use/Continued Problem Potential, Risk level: 3    Problem Statement from Comprehensive Assessment: 8/12/2019:  Patient reports some treatment experience, and so appears to have some knowledge of addiction and recovery. She appears to lack consistency in using healthy coping skills as evidence by her binge use of alcohol. Patient's mental and lack of support appear to indicate an increased risk of relapse at this time.     Problem: Patient lacks skills to maintain sobriety.   Goal: Patient to gain skills to help prevent relapse.   Must be reached to complete treatment? Yes  Methods/Strategies (must include amount and frequency):   1. Patient to share in daily check-in any urges and addictive thinking to better understand her pattern of use and to prevent relapse in the future.   2. Patient to identify at least 5 personal triggers  and high-risk situations for relapse.  3. Patient to learn and implement at least 5 new personal coping strategies to manage urges to use.  4. Patient to identify 5 needs that were met by substance use, and 5 things she can do to meet those needs without substance use.   5. Patient to identify at least 2 hobbies to engage in weekly to help address boredom.   Target Date: 11/22/19  Completion Date:     Dimension 6: Recovery Environment, Risk level: 3    Problem Statement from Comprehensive Assessment: 8/12/2019:  Patient is currently unemployed, and so lack structured activity for her daily life. She was able to identify hobbies she enjoys, however does not appear to engage in those things when she is drinking. Patient reports a stable and supportive place to live currently. She reports that she lacks support for her sobriety as most of her family drinks, and she doesn't have supportive friends at this time. Patient denies any current legal involvement.     Problem: Lack of sober support   Goal: Patient to expand sober support and increase time spent in activities that will promote recovery.  Must be reached to complete treatment? yes  Methods/Strategies (must include amount and frequency):   1. Explore and identify sober support meetings to regularly attend.   2. Patient to develop weekly schedule of activities, and include other activities that you think will benefit your recovery. Examples could be scheduled time with family, working out, or other activities that you find enjoyable.  3. Patient to reach out to supportive family and friends at least once per week, share progress in daily check-ins and 1:1s.  4. Patient to attend [facility] intake appointment on 9/20/19.   Target Date: 11/22/19  Completion Date:       Resources  Resources to which the patient is being referred for problems when problems are to be addressed concurrently by another provider: therapist, , PCP, and all other providers as  determined throughout treatment.         By signing this document, I am acknowledging that I was actively and directly involved in the development of my treatment plan.         Patient Signature:_____________________________            Date:_____________     Staff Signature:   Vita Delgado LMFT, [facility]               Date: 9/17/2019, 11:48 AM

## 2021-06-01 NOTE — PROGRESS NOTES
Terrie Mendez attended 3 hours of group today.     The group topic was Feelings/Emotions, patient was responsive to topic.     Patients engagement in the group session: high     Total group size: 9      Vita Delgado  9/13/2019, 2:08 PM

## 2021-06-01 NOTE — PROGRESS NOTES
Terrie Mendez attended 3 hours of group on 9/27/2019.     The group topic was Change, patient was responsive to topic.     Patients engagement in the group session: high     Total number of patients present 8.     Supervising MD: Dr. Robbie Arboleda, [facility]  9/27/2019, 12:11 PM

## 2021-06-01 NOTE — PROGRESS NOTES
Weekly Progress Note  Terrie Mendez  1960  [id number]        D) Pt attended 2 of 3 groups this week with 1 excused absence on 9/20. Patient attended 0 individual sessions this week. A) Staff facilitated groups and reviewed tx progress. Assessed for [facility]. R) No VAP needed at this time.      Any significant events, defines as events that impact patients relationship with others inside and outside of treatment: Yes, patient reports frustration with [employer].   Indicate any changes or monitoring of physical or mental health problems: No  Indicate involvement by any outside supports: No  IAPP reviewed and modified as needed: NA     Pt working on the following dimensions:  Dimension #1 - Withdrawal Potential - Risk 0. Patient reports binge drinking on alcohol, with her last reported use being on 8/5/19, at time of intake. Patient reports one time alcohol use the weekend of 9/6/19.  She denies any current withdrawal symptoms or concerns. Patient shows no physical signs or symptoms of intoxication or withdrawal. She does not appear to be at risk of withdrawal at this time. Patient is oriented x4.   Specific goals from treatment plan addressed this week:  Patient reports abstinence this week. No withdrawal symptoms observed or reported.   Effectiveness of strategies:  Strategies are effective.   Dimension #2 - Biomedical - Risk 0. Patient denies any current medical issues or concerns. She endorses having a PCP whom she sees for her healthcare needs and concerns. Patient appears able to get her medical needs addressed as necessary. She appears to be in adequate physical health and functioning at this time.   Specific goals from treatment plan addressed this week:  Patient denies any major medical issues this week. Patient does report that she has been able to breathe more easily after being prescribed inhalers from her PCP for COPD.   Effectiveness of strategies:  Strategies are effective.   Dimension #3 -  Emotional/Behavioral/Cognitive - Risk 2. Patient reports mental health diagnoses of depression, anxiety, and PTSD. Patient completed Brief DA with KATHERIN Gardner LADC at onset of the current treatment episode and was diagnosed with major depressive disorder, recurrent, moderate and PTSD. She reports medication to help manage her symptoms, however reports she does not consistently take it when she is drinking. Patient reports that she does currently have case management services through the [facility] as well as therapy. Patient denies any current suicidal or homicidal thoughts or plans.  Active interventions to stabilize mental health symptoms:  Meds, psychotherapy  Specific goals from treatment plan addressed this week:  Patient reports overall stable mood throughout the week, although she also discussed the emotions she's having associated w/her sister's recent lung cancer diagnosis. Patient reports medication compliance. Denies SI/HI/SIB. Patient reports using mindfulness skills throughout the week.     Effectiveness of strategies:  Strategies are effective.   Dimension #4 - Readiness for Change - Risk 0. Patient reports she is here for treatment on her own regard. Patient verbalizes that she feels she has a problem, and that she has a desire to change. Patient verbalizes willingness to follow treatment recommendations. She appears genuine in her motivation for treatment at this time. She is blending well with peers and has connections with other women her age in the group.   Specific goals from treatment plan addressed this week:  Patient attended 2/3 groups this week. Patient attended 0 individual session this week. Patient actively participated in group discussions when present.   Effectiveness of strategies:  Strategies are effective.  Dimension #5 - Relapse Potential - Risk 3. Patient reports some treatment experience, and so appears to have some knowledge of addiction and recovery. She appears to lack  consistency in using healthy coping skills as evidence by her binge use of alcohol. Patient's mental and lack of support appear to indicate an increased risk of relapse at this time. Patient reports one time alcohol use the weekend of 9/6/19.   Specific goals from treatment plan addressed this week:  Patient denies substance use this week. She reported thoughts of using, and that she coped by taking a nap; also, using activities and food to distract herself.  Effectiveness of strategies:  Strategies are effective.   Dimension #6 - Recovery Environment - Risk 3. Patient is currently unemployed, and so lack structured activity for her daily life. She was able to identify hobbies she enjoys, however does not appear to engage in those things when she is drinking. Patient reports a stable and supportive place to live currently. She reports that she lacks support for her sobriety as most of her family drinks, and she doesn't have supportive friends at this time. Patient denies any current legal involvement.   Specific goals from treatment plan addressed this week:  Patient attended her intake appointment on 9/23 for the [facility] program through [facility] and she signed up for a community Unisfairing class. She also investigated and then signed up for a Scar chi class at a [employer] by her house. She reports loving the fall weather and planning to get outside and walk this week.  Effectiveness of strategies:  Strategies are effective.   T) Treatment plan updated: No  Patient notified and in agreement: No  Patient educated on Change. Patient has completed 41 of 90 program hours at this time. Patient is currently in phase 1. Projected discharge date is 11/13/29. Current discharge plan is Phase 3.      BRITTANI Rowe, [facility]  9/19/2019, 10:38 AM        Weekly Educational Topics Date   1. Dual Diagnoses, week 1 8/26, 8/28, 8/30   2. Dual Diagnoses, week 2     3. Stress Management 8/14,16,19,21   4. Feelings/Emotions  9/11, 9/13   5. Thinking 9/16, 9/18   6. Change 9/23, 9/25   7. Recovery Support     8. Relationships/Communication     9. Addiction 101     10. Relapse Prevention     11. Grief and Loss     12. Strengths     13. Wellness

## 2021-06-01 NOTE — PROGRESS NOTES
Terrie Mendez attended 3 hours of group today.     The group topic was Thinking, patient was responsive to topic.     Patients engagement in the group session: high     Total group size: 6      Vita Delgado  9/18/2019, 2:00 PM

## 2021-06-01 NOTE — PROGRESS NOTES
Weekly Progress Note  Terrie Mendez  1960  [id number]      D) Pt attended 1 of 2 groups this week with 1 absences. Patient attended 0 individual sessions this week. A) Staff facilitated groups and reviewed tx progress. Assessed for [facility]. R) No VAP needed at this time.     Any significant events, defines as events that impact patients relationship with others inside and outside of treatment: No  Indicate any changes or monitoring of physical or mental health problems: No  Indicate involvement by any outside supports: No  IAPP reviewed and modified as needed: NA    Pt working on the following dimensions:  Dimension #1 - Withdrawal Potential - Risk 0. Patient reports binge drinking on alcohol, with her last reported use being on 8/5/19. She denies any current withdrawal symptoms or concerns. Patient shows no physical signs or symptoms of intoxication or withdrawal. She does not appear to be at risk of withdrawal at this time. Patient is oriented x4.   Specific goals from treatment plan addressed this week:  Patient reports drinking over the weekend. No withdrawal symptoms observed or reported.   Effectiveness of strategies:  Strategies are effective.   Dimension #2 - Biomedical - Risk 0. Patient denies any current medical issues or concerns. She endorses having a PCP whom she sees for her healthcare needs and concerns. Patient appears able to get her medical needs addressed as necessary. She appears to be in adequate physical health and functioning at this time.   Specific goals from treatment plan addressed this week:  Patient did not report any major medical concerns this week.   Effectiveness of strategies:  Strategies are effective.   Dimension #3 - Emotional/Behavioral/Cognitive - Risk 2. Patient reports mental health diagnoses of depression, anxiety, and PTSD. Patient completed Brief DA with KATHERIN Gardner LADC at onset of the current treatment episode and was diagnosed with major depressive disorder,  recurrent, moderate and PTSD. She reports medication to help manage her symptoms, however reports she does not consistently take it when she is drinking. Patient reports that she does currently have case management services through the [facility] as well as therapy. Patient denies any current suicidal or homicidal thoughts or plans.  Active interventions to stabilize mental health symptoms:  Meds, psychotherapy  Specific goals from treatment plan addressed this week:  Patient reports stable mood throughout the week. Patient reports medication compliance. Denies SI/HI/SIB. Patient repots active skill use this week including walking, talking to others, eating good food, and being active.   Effectiveness of strategies:  Strategies are effective.   Dimension #4 - Readiness for Change - Risk 0. Patient reports she is here for treatment on her own regard. Patient verbalizes that she feels she has a problem, and that she has a desire to change. Patient verbalizes willingness to follow treatment recommendations. She appears genuine in her motivation for treatment at this time. She is blending well with peers and has connections with other women her age in the group.   Specific goals from treatment plan addressed this week:  Patient attended 1/2 groups this week, absence was excused. Individual session scheduled for 9/4, however, patient did not attend group due to recovering from recent relapse.   Effectiveness of strategies:  Strategies are effective.  Dimension #5 - Relapse Potential - Risk 3. Patient reports some treatment experience, and so appears to have some knowledge of addiction and recovery. She appears to lack consistency in using healthy coping skills as evidence by her binge use of alcohol. Patient's mental and lack of support appear to indicate an increased risk of relapse at this time.   Specific goals from treatment plan addressed this week:  Patient left voicemail for writer on 9/4 and stated that she would not be  attending group due to relapse over the long weekend. She stated that she would be back in group on 9/6/19 and would talk about her relapse then.   Effectiveness of strategies:  Strategies are effective.   Dimension #6 - Recovery Environment - Risk 3. Patient is currently unemployed, and so lack structured activity for her daily life. She was able to identify hobbies she enjoys, however does not appear to engage in those things when she is drinking. Patient reports a stable and supportive place to live currently. She reports that she lacks support for her sobriety as most of her family drinks, and she doesn't have supportive friends at this time. Patient denies any current legal involvement.   Specific goals from treatment plan addressed this week:  Patient reported in group on Friday plans to spend time with family over the weekend.   Effectiveness of strategies:  Strategies are effective.   T) Treatment plan updated: no.  Patient notified and in agreement: NA.  Patient educated on Dual Diagnosis II. Patient has completed 22 of 90 program hours at this time. Patient is currently in phase 1. Projected discharge date is 11/13/29. Current discharge plan is Phase 3.     BRITTANI Rowe, [facility]  9/5/2019, 3:00 PM       Weekly Educational Topics Date   1. Dual Diagnoses, week 1 8/26, 8/28, 8/30   2. Dual Diagnoses, week 2    3. Stress Management 8/14,16,19,21   4. Feelings/Emotions    5. Thinking    6. Change    7. Recovery Support    8. Relationships/Communication    9. Addiction 101    10. Relapse Prevention    11. Grief and Loss    12. Strengths    13. Wellness

## 2021-06-01 NOTE — PROGRESS NOTES
Terrie Mendez attended 3 hours of group on 9/25/2019.     The group topic was Change, patient was responsive to topic.     Patients engagement in the group session: high     Total number of patients present 11.     Supervising MD: Dr. Gia Arboleda, [facility]  9/25/2019, 1:41 PM

## 2021-06-01 NOTE — PROGRESS NOTES
Terrie Mendez attended 3 hours of group today.     The group topic was Thinking, patient was responsive to topic.     Patients engagement in the group session: high     Total group size: 10      Vita Delgado  9/16/2019, 2:02 PM

## 2021-06-01 NOTE — PROGRESS NOTES
INDIVIDUAL SESSION NOTE    D) Patient met 1:1 with counselor for an individual session lasting 40 minutes.     A) Patient and writer discussed patient's recent relapse and what led up to it. Patient and writer discussed how patient spends her time as she indicated recent boredom.     R) Patient identified that boredom and loneliness were the triggers for her relapse. Patient stated that she wants to get back into her hobbies and identified that she has an upcoming intake appointment for [employer], a program that offers classes for adults with mental illness. Patient also reports wanting to work in order to bring in more cash as right now she is living off of [place]. Patient expressed interest in getting back into doing yoga and plans to check out a new studio on [address].     T) Patient will continue attending 3x weekly. Patient will work to increase time spent in hobbies.       Patient treatment was reviewed. Changes were made. Patient was actively and directly involved in the treatment plan review and updates.     Vita Delgado MA, LMFT, [facility]  9/17/2019, 10:04 AM

## 2021-06-01 NOTE — PROGRESS NOTES
Weekly Progress Note  Terrie Mendez  1960  [id number]      D) Pt attended 3 of 3 groups this week with 0absences. Patient attended 1 individual sessions this week. A) Staff facilitated groups and reviewed tx progress. [facility] for [facility]. R) No VAP needed at this time.     Any significant events, defines as events that impact patients relationship with others inside and outside of treatment: Yes, patient reports frustration with LATA.   Indicate any changes or monitoring of physical or mental health problems: No  Indicate involvement by any outside supports: No  IAPP reviewed and modified as needed: NA    Pt working on the following dimensions:  Dimension #1 - Withdrawal Potential - Risk 0. Patient reports binge drinking on alcohol, with her last reported use being on 8/5/19, at time of intake. Patient reports one time alcohol use the weekend of 9/6/19.  She denies any current withdrawal symptoms or concerns. Patient shows no physical signs or symptoms of intoxication or withdrawal. She does not appear to be at risk of withdrawal at this time. Patient is oriented x4.   Specific goals from treatment plan addressed this week:  Patient reports abstinence this week. No withdrawal symptoms observed or reported.   Effectiveness of strategies:  Strategies are effective.   Dimension #2 - Biomedical - Risk 0. Patient denies any current medical issues or concerns. She endorses having a PCP whom she sees for her healthcare needs and concerns. Patient appears able to get her medical needs addressed as necessary. She appears to be in adequate physical health and functioning at this time.   Specific goals from treatment plan addressed this week:  Patient denies any major medical issues this week. Patient does report that she has been able to breathe more easily after being prescribed inhalers from her PCP for COPD.   Effectiveness of strategies:  Strategies are effective.   Dimension #3 - Emotional/Behavioral/Cognitive - Risk 2.  Patient reports mental health diagnoses of depression, anxiety, and PTSD. Patient completed Brief DA with KATHERIN Gardner, DINORA at onset of the current treatment episode and was diagnosed with major depressive disorder, recurrent, moderate and PTSD. She reports medication to help manage her symptoms, however reports she does not consistently take it when she is drinking. Patient reports that she does currently have case management services through the [facility] as well as therapy. Patient denies any current suicidal or homicidal thoughts or plans.  Active interventions to stabilize mental health symptoms:  Meds, psychotherapy  Specific goals from treatment plan addressed this week:  Patient reports stable mood throughout the week. Patient reports feeling some frustration when at her step-mothers related to her siblings and other family members. Patient reports medication compliance. Denies SI/HI/SIB. Patient reports using cleaning and mindfulness as skills throughout the week. She further reports working to actively stay in the present more. Patient identifies having some circular thinking over the weekend related to her siblings and states that she was able to recognize this pattern and not get caught up in it.    Effectiveness of strategies:  Strategies are effective.   Dimension #4 - Readiness for Change - Risk 0. Patient reports she is here for treatment on her own regard. Patient verbalizes that she feels she has a problem, and that she has a desire to change. Patient verbalizes willingness to follow treatment recommendations. She appears genuine in her motivation for treatment at this time. She is blending well with peers and has connections with other women her age in the group.   Specific goals from treatment plan addressed this week:  Patient attended 3/3 groups this week. Patient attended 1 individual session this week. Patient reports desire to address both mental health and substance use issues.    Effectiveness of strategies:  Strategies are effective.  Dimension #5 - Relapse Potential - Risk 3. Patient reports some treatment experience, and so appears to have some knowledge of addiction and recovery. She appears to lack consistency in using healthy coping skills as evidence by her binge use of alcohol. Patient's mental and lack of support appear to indicate an increased risk of relapse at this time. Patient reports one time alcohol use the weekend of 9/6/19.   Specific goals from treatment plan addressed this week:  Patient denies substance use this week. She denies any urges or cravings. Patient reports reading some recovery material over the weekend and found it helpful.   Effectiveness of strategies:  Strategies are effective.   Dimension #6 - Recovery Environment - Risk 3. Patient is currently unemployed, and so lack structured activity for her daily life. She was able to identify hobbies she enjoys, however does not appear to engage in those things when she is drinking. Patient reports a stable and supportive place to live currently. She reports that she lacks support for her sobriety as most of her family drinks, and she doesn't have supportive friends at this time. Patient denies any current legal involvement.   Specific goals from treatment plan addressed this week:  Patient reports spending time with her step-mother over the weekend. She reports it was nice to connect with her and they had a lot of fun. Patient is working on identifying hobbies she wants to engage in such as crafting and yoga. Patient has an intake appointment on 9/23 for the [facility] program through [facility] and she states she wants to do one of the community education classes.  Effectiveness of strategies:  Strategies are effective.   T) Treatment plan updated: Yes  Patient notified and in agreement: Yes  Patient educated on Feelings/Emotions/Thinking. Patient has completed 35 of 90 program hours at this time. Patient is  currently in phase 1. Projected discharge date is 11/13/29. Current discharge plan is Phase 3.     BRITTANI Rowe, [facility]  9/19/2019, 10:38 AM       Weekly Educational Topics Date   1. Dual Diagnoses, week 1 8/26, 8/28, 8/30   2. Dual Diagnoses, week 2    3. Stress Management 8/14,16,19,21   4. Feelings/Emotions 9/11, 9/13   5. Thinking 9/16, 9/18   6. Change    7. Recovery Support    8. Relationships/Communication    9. Addiction 101    10. Relapse Prevention    11. Grief and Loss    12. Strengths    13. Wellness

## 2021-06-01 NOTE — PROGRESS NOTES
Terrie Mendez attended 3 hours of group today.     The group topic was Change, patient was responsive to topic.     Patients engagement in the group session: high     Total group size: 10      DINORA Enamorado  9/23/2019, 2:00 PM

## 2021-06-02 NOTE — PROGRESS NOTES
Weekly Progress Note  Terrie Mendez  1960  [id number]        D) Pt attended 1 of 3 groups this week with 2 excused absences on 10/21 and 10/23. Patient attended 0 individual sessions this week. A) Staff facilitated groups and reviewed tx progress. Assessed for [facility]. R) No VAP needed at this time.      Any significant events, defines as events that impact patients relationship with others inside and outside of treatment: Yes, patient reports frustration with LATA.   Indicate any changes or monitoring of physical or mental health problems: No  Indicate involvement by any outside supports: No  IAPP reviewed and modified as needed: NA     Pt working on the following dimensions:  Dimension #1 - Withdrawal Potential - Risk 0. Patient reports binge drinking on alcohol, with her last reported use being on 8/5/19, at time of intake. Patient reports one time alcohol use the weekend of 9/6/19 and another reported instance of on and off drinking from 10/1/19 to 10/6/19.  She denies any current withdrawal symptoms or concerns. Patient shows no physical signs or symptoms of intoxication or withdrawal. She does not appear to be at risk of withdrawal at this time. Patient is oriented x4.   Specific goals from treatment plan addressed this week:  Patient reports abstinence this week. No withdrawal symptoms observed or reported.   Effectiveness of strategies:  Strategies are effective.   Dimension #2 - Biomedical - Risk 0. Patient denies any current medical issues or concerns. She endorses having a PCP whom she sees for her healthcare needs and concerns. Patient appears able to get her medical needs addressed as necessary. She appears to be in adequate physical health and functioning at this time. Patient was recently diagnosed with COPD.   Specific goals from treatment plan addressed this week:  Patient denies any major medical issues this week.   Effectiveness of strategies:  Strategies are effective.   Dimension #3 -  Emotional/Behavioral/Cognitive - Risk 2. Patient reports mental health diagnoses of depression, anxiety, and PTSD. Patient completed Brief DA with KATHERIN Gardner LADC at onset of the current treatment episode and was diagnosed with major depressive disorder, recurrent, moderate and PTSD. She reports medication to help manage her symptoms, however reports she does not consistently take it when she is drinking. Patient reports that she does currently have case management services through the [facility] as well as therapy. Patient denies any current suicidal or homicidal thoughts or plans.  Active interventions to stabilize mental health symptoms:  Meds, psychotherapy  Specific goals from treatment plan addressed this week:  Patient reports an improvement in mood this week. Patient reports medication compliance. Denies SI/HI/SIB. Patient reports using coping skills including: cooking and socializing.       Effectiveness of strategies:  Strategies are effective.   Dimension #4 - Readiness for Change - Risk 0. Patient reports she is here for treatment on her own regard. Patient verbalizes that she feels she has a problem, and that she has a desire to change. Patient verbalizes willingness to follow treatment recommendations. She appears genuine in her motivation for treatment at this time. She is blending well with peers and has connections with other women her age in the group.   Specific goals from treatment plan addressed this week:  Patient attended 1/3 groups this week, two absences were excused. Patient attended 0 individual session this week. Patient actively participated in group discussions when present.   Effectiveness of strategies:  Strategies are effective.  Dimension #5 - Relapse Potential - Risk 3. Patient reports some treatment experience, and so appears to have some knowledge of addiction and recovery. She appears to lack consistency in using healthy coping skills as evidence by her binge use of alcohol.  Patient's mental and lack of support appear to indicate an increased risk of relapse at this time. Patient reports one time alcohol use the weekend of 9/6/19. Patient reports drinking on and off from 10/1/19 to 10/6/19.  Specific goals from treatment plan addressed this week:  Patient denies any substance use this week and further denies experiencing any urges or cravings to drink.   Effectiveness of strategies:  Strategies appear effective.   Dimension #6 - Recovery Environment - Risk 3. Patient is currently unemployed, and so lack structured activity for her daily life. She was able to identify hobbies she enjoys, however does not appear to engage in those things when she is drinking. Patient reports a stable and supportive place to live currently. She reports that she lacks support for her sobriety as most of her family drinks, and she doesn't have supportive friends at this time. Patient denies any current legal involvement.   Specific goals from treatment plan addressed this week:  Patient reports socializing more than normal this week. Patient is planning to stay at her sister's next week to help with recovery from surgery.   Effectiveness of strategies:  Strategies are effective.   T) Treatment plan updated: No  Patient notified and in agreement: No  Patient educated on Recovery Support. Patient has completed 57 of 90 program hours at this time. Patient is currently in phase 1. Projected discharge date is 11/13/29. Current discharge plan is Phase 3.      BRITTANI Rowe, [facility]  9/19/2019, 10:38 AM        Weekly Educational Topics Date   1. Dual Diagnoses, week 1 8/26, 8/28, 8/30   2. Dual Diagnoses, week 2     3. Stress Management 8/14,16,19,21   4. Feelings/Emotions 9/11, 9/13   5. Thinking 9/16, 9/18   6. Change 9/23, 9/25, 9/27   7. Recovery Support 10/14, 10/16, 10/18   8. Relationships/Communication     9. Addiction 101     10. Relapse Prevention 10/9   11. Grief and Loss     12. Strengths     13.  Wellness

## 2021-06-02 NOTE — PROGRESS NOTES
Weekly Progress Note  Terrie Mendez  1960  [id number]        D) Pt attended 1 of 3 groups this week with 2 excused absences on 10/25 and 10/28. Patient attended 0 individual sessions this week. A) Staff facilitated groups and reviewed tx progress. Assessed for [facility]. R) No VAP needed at this time.      Any significant events, defines as events that impact patients relationship with others inside and outside of treatment: Yes, patient reports frustration with LATA.   Indicate any changes or monitoring of physical or mental health problems: No  Indicate involvement by any outside supports: No  IAPP reviewed and modified as needed: NA     Pt working on the following dimensions:  Dimension #1 - Withdrawal Potential - Risk 0. Patient reports binge drinking on alcohol, with her last reported use being on 8/5/19, at time of intake. Patient reports one time alcohol use the weekend of 9/6/19 and another reported instance of on and off drinking from 10/1/19 to 10/6/19.  She denies any current withdrawal symptoms or concerns. Patient shows no physical signs or symptoms of intoxication or withdrawal. She does not appear to be at risk of withdrawal at this time. Patient is oriented x4.   Specific goals from treatment plan addressed this week:  Patient reports abstinence this week. No withdrawal symptoms observed or reported.   Effectiveness of strategies:  Strategies are effective.   Dimension #2 - Biomedical - Risk 0. Patient denies any current medical issues or concerns. She endorses having a PCP whom she sees for her healthcare needs and concerns. Patient appears able to get her medical needs addressed as necessary. She appears to be in adequate physical health and functioning at this time. Patient was recently diagnosed with COPD.   Specific goals from treatment plan addressed this week:  Patient denies any major medical issues this week.   Effectiveness of strategies:  Strategies are effective.   Dimension #3 -  Emotional/Behavioral/Cognitive - Risk 2. Patient reports mental health diagnoses of depression, anxiety, and PTSD. Patient completed Brief DA with AKTHERIN Gardner LADC at onset of the current treatment episode and was diagnosed with major depressive disorder, recurrent, moderate and PTSD. She reports medication to help manage her symptoms, however reports she does not consistently take it when she is drinking. Patient reports that she does currently have case management services through the [facility] as well as therapy. Patient denies any current suicidal or homicidal thoughts or plans.  Active interventions to stabilize mental health symptoms:  Meds, psychotherapy  Specific goals from treatment plan addressed this week:  Patient reports feeling exhausted this week. Patient reports medication compliance. Denies SI/HI/SIB. Patient reports using coping skills including: breathing, meditation, and cleaning.   Effectiveness of strategies:  Strategies are effective.   Dimension #4 - Readiness for Change - Risk 0. Patient reports she is here for treatment on her own regard. Patient verbalizes that she feels she has a problem, and that she has a desire to change. Patient verbalizes willingness to follow treatment recommendations. She appears genuine in her motivation for treatment at this time. She is blending well with peers and has connections with other women her age in the group.   Specific goals from treatment plan addressed this week:  Patient attended 1/3 groups this week, two absences were excused. Patient attended 0 individual session this week. Patient continues to actively participate in group sessions.   Effectiveness of strategies:  Strategies are effective.  Dimension #5 - Relapse Potential - Risk 3. Patient reports some treatment experience, and so appears to have some knowledge of addiction and recovery. She appears to lack consistency in using healthy coping skills as evidence by her binge use of alcohol.  Patient's mental and lack of support appear to indicate an increased risk of relapse at this time. Patient reports one time alcohol use the weekend of 9/6/19. Patient reports drinking on and off from 10/1/19 to 10/6/19.  Specific goals from treatment plan addressed this week:  Patient denies any substance use this week and further denies experiencing any urges or cravings to drink.   Effectiveness of strategies:  Strategies appear effective.   Dimension #6 - Recovery Environment - Risk 3. Patient is currently unemployed, and so lack structured activity for her daily life. She was able to identify hobbies she enjoys, however does not appear to engage in those things when she is drinking. Patient reports a stable and supportive place to live currently. She reports that she lacks support for her sobriety as most of her family drinks, and she doesn't have supportive friends at this time. Patient denies any current legal involvement.   Specific goals from treatment plan addressed this week:  Patient reports spending her time away from group with his sister helping her with recovery from recent surgery.   Effectiveness of strategies:  Strategies are effective.   T) Treatment plan updated: No  Patient notified and in agreement: No  Patient educated on Grief and Loss. Patient has completed 60 of 90 program hours at this time. Patient is currently in phase 1. Projected discharge date is 11/13/29. Current discharge plan is Phase 3.      Vita Delgado LMFT, [facility]  9/19/2019, 10:38 AM        Weekly Educational Topics Date   1. Dual Diagnoses, week 1 8/26, 8/28, 8/30   2. Dual Diagnoses, week 2     3. Stress Management 8/14,16,19,21   4. Feelings/Emotions 9/11, 9/13   5. Thinking 9/16, 9/18   6. Change 9/23, 9/25, 9/27   7. Recovery Support 10/14, 10/16, 10/18   8. Relationships/Communication     9. Addiction 101     10. Relapse Prevention 10/9   11. Grief and Loss 10/30    12. Strengths     13. Wellness

## 2021-06-02 NOTE — PROGRESS NOTES
Terrie Mendez attended 3 hours of group today.     The group topic was Recovery Support, patient was responsive to topic.     Patient's engagement in the group session: high     Total group size: 10      Vita Delgado  10/16/2019, 12:28 PM

## 2021-06-02 NOTE — PROGRESS NOTES
INDIVIDUAL SESSION NOTE    D) Patient met 1:1 with counselor for an individual session lasting 50 minutes.     A) Writer and patient's discussed recent stressors that contributed to patient's relapse. Writer and patient discussed what was missing in patient's recovery program.     R) Patient identified that her sister's health, her own health situation and upcoming disability hearing were stressors as well as her birthday. Patient reports that she feels isolated and disconnected and would benefit from connection. She states that she used to be very involved in [employer] and feels this might be helpful again.     T) Patient will look into options for a [employer] she can attend.      Patient treatment was reviewed. Changes were not made. Patient was actively and directly involved in the treatment plan review and updates.     Vita Delgado MA, LMFT, [facility]  10/10/2019, 3:25 PM

## 2021-06-02 NOTE — PROGRESS NOTES
Terrie Mendez attended 3 hours of group today.     The group topic was Grief and Loss, patient was responsive to topic.     Patient's engagement in the group session: high     Total group size: 11      Juanita Fernández  10/30/2019, 12:24 PM

## 2021-06-02 NOTE — PROGRESS NOTES
Terrie Mendez attended 3 hours of group today.     The group topic was Relapse Prevention, patient was responsive to topic. Patient discussed recent relapse in group.     Patients engagement in the group session: medium     Total group size: 11      Vita Delgado  10/9/2019, 12:23 PM  
PRE-OP DIAGNOSIS:  Hypertrophy of nasal turbinates 05-Dec-2019 11:55:31  Nano Thompson

## 2021-06-02 NOTE — PROGRESS NOTES
Weekly Progress Note  Terrie Mendez  1960  [id number]        D) Pt attended 1 of 3 groups this week with 1 excused absence on 9/30 and 1 unexcused absence on 10/2. Patient attended 0 individual sessions this week. A) Staff facilitated groups and reviewed tx progress. Assessed for [facility]. R) No VAP needed at this time.      Any significant events, defines as events that impact patients relationship with others inside and outside of treatment: Yes, patient reports frustration with [facility].   Indicate any changes or monitoring of physical or mental health problems: No  Indicate involvement by any outside supports: No  IAPP reviewed and modified as needed: NA     Pt working on the following dimensions:  Dimension #1 - Withdrawal Potential - Risk 0. Patient reports binge drinking on alcohol, with her last reported use being on 8/5/19, at time of intake. Patient reports one time alcohol use the weekend of 9/6/19.  She denies any current withdrawal symptoms or concerns. Patient shows no physical signs or symptoms of intoxication or withdrawal. She does not appear to be at risk of withdrawal at this time. Patient is oriented x4.   Specific goals from treatment plan addressed this week:  Patient reports drinking on 10/1/19. No withdrawal symptoms observed or reported.   Effectiveness of strategies:  Strategies are effective.   Dimension #2 - Biomedical - Risk 0. Patient denies any current medical issues or concerns. She endorses having a PCP whom she sees for her healthcare needs and concerns. Patient appears able to get her medical needs addressed as necessary. She appears to be in adequate physical health and functioning at this time.   Specific goals from treatment plan addressed this week:  Patient denies any major medical issues this week.   Effectiveness of strategies:  Strategies are effective.   Dimension #3 - Emotional/Behavioral/Cognitive - Risk 2. Patient reports mental health diagnoses of depression, anxiety, and  PTSD. Patient completed Brief DA with KATHERIN Gardner, DINORA at onset of the current treatment episode and was diagnosed with major depressive disorder, recurrent, moderate and PTSD. She reports medication to help manage her symptoms, however reports she does not consistently take it when she is drinking. Patient reports that she does currently have case management services through the [facility] as well as therapy. Patient denies any current suicidal or homicidal thoughts or plans.  Active interventions to stabilize mental health symptoms:  Meds, psychotherapy  Specific goals from treatment plan addressed this week:  Patient reports continuing to struggle with sister's cancer diagnosis; patient called writer on 10/1 and stated that she had began drinking in order to cope with the situation. Patient reports medication compliance. Denies SI/HI/SIB. Patient appears to be struggling to use coping skills to address difficult emotions related to her sister's cancer diagnosis.      Effectiveness of strategies:  Strategies are effective.   Dimension #4 - Readiness for Change - Risk 0. Patient reports she is here for treatment on her own regard. Patient verbalizes that she feels she has a problem, and that she has a desire to change. Patient verbalizes willingness to follow treatment recommendations. She appears genuine in her motivation for treatment at this time. She is blending well with peers and has connections with other women her age in the group.   Specific goals from treatment plan addressed this week:  Patient attended 1/3 groups this week. Patient attended 0 individual session this week. Patient actively participated in group discussions when present.   Effectiveness of strategies:  Strategies are effective.  Dimension #5 - Relapse Potential - Risk 3. Patient reports some treatment experience, and so appears to have some knowledge of addiction and recovery. She appears to lack consistency in using healthy coping  "skills as evidence by her binge use of alcohol. Patient's mental and lack of support appear to indicate an increased risk of relapse at this time. Patient reports one time alcohol use the weekend of 9/6/19.   Specific goals from treatment plan addressed this week:  Patient reports drinking on 10/1/19. Patient called writer and self-reported her drinking and was \"buzzed\" during the phone call. Patient appeared at a loss on how to cope with her stressors, however, did identify that in order to support her sister she would need to be sober.   Effectiveness of strategies:  Strategies are questionable. Patient may need to be referred to a higher level of care if she has been unable to remain abstinent after 10/1/19.   Dimension #6 - Recovery Environment - Risk 3. Patient is currently unemployed, and so lack structured activity for her daily life. She was able to identify hobbies she enjoys, however does not appear to engage in those things when she is drinking. Patient reports a stable and supportive place to live currently. She reports that she lacks support for her sobriety as most of her family drinks, and she doesn't have supportive friends at this time. Patient denies any current legal involvement.   Specific goals from treatment plan addressed this week:  Patient spent time with her family over the weekend.   Effectiveness of strategies:  Strategies are effective.   T) Treatment plan updated: No  Patient notified and in agreement: No  Patient educated on Change. Patient has completed 44 of 90 program hours at this time. Patient is currently in phase 1. Projected discharge date is 11/13/29. Current discharge plan is Phase 3.      BRITTANI Rowe, [facility]  9/19/2019, 10:38 AM        Weekly Educational Topics Date   1. Dual Diagnoses, week 1 8/26, 8/28, 8/30   2. Dual Diagnoses, week 2     3. Stress Management 8/14,16,19,21   4. Feelings/Emotions 9/11, 9/13   5. Thinking 9/16, 9/18   6. Change 9/23, 9/25, 9/27 "   7. Recovery Support     8. Relationships/Communication     9. Addiction 101     10. Relapse Prevention     11. Grief and Loss     12. Strengths     13. Wellness

## 2021-06-02 NOTE — PROGRESS NOTES
Weekly Progress Note  Terrie Mendez  1960  [id number]        D) Pt attended 1 of 3 groups this week with 1 excused absence on 10/7 and 1 unexcused absence on 10/4. Patient attended 1 individual sessions this week. A) Staff facilitated groups and reviewed tx progress. Assessed for [facility]. R) No VAP needed at this time.      Any significant events, defines as events that impact patients relationship with others inside and outside of treatment: Yes, patient reports frustration with [facility].   Indicate any changes or monitoring of physical or mental health problems: No  Indicate involvement by any outside supports: No  IAPP reviewed and modified as needed: NA     Pt working on the following dimensions:  Dimension #1 - Withdrawal Potential - Risk 0. Patient reports binge drinking on alcohol, with her last reported use being on 8/5/19, at time of intake. Patient reports one time alcohol use the weekend of 9/6/19.  She denies any current withdrawal symptoms or concerns. Patient shows no physical signs or symptoms of intoxication or withdrawal. She does not appear to be at risk of withdrawal at this time. Patient is oriented x4.   Specific goals from treatment plan addressed this week:  Patient reports on and off drinking from 10/1/19 to 10/6/19. No withdrawal symptoms observed or reported.   Effectiveness of strategies:  Strategies are effective.   Dimension #2 - Biomedical - Risk 0. Patient denies any current medical issues or concerns. She endorses having a PCP whom she sees for her healthcare needs and concerns. Patient appears able to get her medical needs addressed as necessary. She appears to be in adequate physical health and functioning at this time.   Specific goals from treatment plan addressed this week:  Patient denies any major medical issues this week. Patient states that she was referred for a CT scan by her provider, however, is unsure why as her provider told her at her last appointment that her lungs sounded  good.   Effectiveness of strategies:  Strategies are effective.   Dimension #3 - Emotional/Behavioral/Cognitive - Risk 2. Patient reports mental health diagnoses of depression, anxiety, and PTSD. Patient completed Brief DA with KATHERIN Gardner LADC at onset of the current treatment episode and was diagnosed with major depressive disorder, recurrent, moderate and PTSD. She reports medication to help manage her symptoms, however reports she does not consistently take it when she is drinking. Patient reports that she does currently have case management services through the [facility] as well as therapy. Patient denies any current suicidal or homicidal thoughts or plans.  Active interventions to stabilize mental health symptoms:  Meds, psychotherapy  Specific goals from treatment plan addressed this week:  Patient reports feeling stressed and overwhelmed. Patient continues to struggle with her sister's cancer diagnosis. Patient also reports feeling anxiety, selfish, and discouraged this week.  Patient reports medication compliance. Denies SI/HI/SIB. Patient appears to be struggling to use coping skills to address difficult emotions related to her sister's cancer diagnosis and her own health concerns as well as upcoming disability court hearing.      Effectiveness of strategies:  Strategies are effective.   Dimension #4 - Readiness for Change - Risk 0. Patient reports she is here for treatment on her own regard. Patient verbalizes that she feels she has a problem, and that she has a desire to change. Patient verbalizes willingness to follow treatment recommendations. She appears genuine in her motivation for treatment at this time. She is blending well with peers and has connections with other women her age in the group.   Specific goals from treatment plan addressed this week:  Patient attended 1/3 groups this week. Patient attended 1 individual session this week. Patient actively participated in group discussions when  present. Patient reached out to writer after her absence and reported desire to re-engage in treatment and actively work on maintaining abstinence.   Effectiveness of strategies:  Strategies are effective.  Dimension #5 - Relapse Potential - Risk 3. Patient reports some treatment experience, and so appears to have some knowledge of addiction and recovery. She appears to lack consistency in using healthy coping skills as evidence by her binge use of alcohol. Patient's mental and lack of support appear to indicate an increased risk of relapse at this time. Patient reports one time alcohol use the weekend of 9/6/19.   Specific goals from treatment plan addressed this week:  Patient reports drinking on and off from 10/1/19 to 10/6/19. Patient identifies that her sister's health, her own health concerns, her birthday, and money issues were triggers for her relapse. Patient identifies that she needs to feel more connected and identified wanting to get back to going to [employer]. Patient also reports she has been using more mindfulness skills since her relapse and has found them to be helpful.   Effectiveness of strategies:  Strategies are questionable. Patient may need to be referred to a higher level of care if she has been unable to remain abstinent after 10/1/19.   Dimension #6 - Recovery Environment - Risk 3. Patient is currently unemployed, and so lack structured activity for her daily life. She was able to identify hobbies she enjoys, however does not appear to engage in those things when she is drinking. Patient reports a stable and supportive place to live currently. She reports that she lacks support for her sobriety as most of her family drinks, and she doesn't have supportive friends at this time. Patient denies any current legal involvement.   Specific goals from treatment plan addressed this week:  Patient reports spending a lot of time in her apartment. She has plans to be at her sister's surgery on 10/11/19  and may try to spend the weekend with one of her children.   Effectiveness of strategies:  Strategies are effective.   T) Treatment plan updated: No  Patient notified and in agreement: No  Patient educated on Change. Patient has completed 44 of 90 program hours at this time. Patient is currently in phase 1. Projected discharge date is 11/13/29. Current discharge plan is Phase 3.      Vita Delgado, LMFT, [facility]  9/19/2019, 10:38 AM        Weekly Educational Topics Date   1. Dual Diagnoses, week 1 8/26, 8/28, 8/30   2. Dual Diagnoses, week 2     3. Stress Management 8/14,16,19,21   4. Feelings/Emotions 9/11, 9/13   5. Thinking 9/16, 9/18   6. Change 9/23, 9/25, 9/27   7. Recovery Support     8. Relationships/Communication     9. Addiction 101     10. Relapse Prevention  10/9   11. Grief and Loss     12. Strengths     13. Wellness

## 2021-06-02 NOTE — PROGRESS NOTES
Terrie Mendez attended 3 hours of group today.     The group topic was Grief and Loss, patient was responsive to topic.     Patient's engagement in the group session: high     Total group size: 9      Vita Delgado  11/1/2019, 1:49 PM

## 2021-06-02 NOTE — PROGRESS NOTES
Terrie Mendez attended 3 hours of group today.     The group topic was Recovery Support, patient was responsive to topic.     Patient's engagement in the group session: high     Total group size: 10      DINORA Gardner  10/18/2019, 12:04 PM

## 2021-06-02 NOTE — PROGRESS NOTES
Weekly Progress Note  Terrie Mendez  1960  [id number]        D) Pt attended 2 of 3 groups this week with 1 excused absence on 10/11. Patient attended 0 individual sessions this week. A) Staff facilitated groups and reviewed tx progress. Assessed for [facility]. R) No VAP needed at this time.      Any significant events, defines as events that impact patients relationship with others inside and outside of treatment: Yes, patient reports frustration with [facility].   Indicate any changes or monitoring of physical or mental health problems: No  Indicate involvement by any outside supports: No  IAPP reviewed and modified as needed: NA     Pt working on the following dimensions:  Dimension #1 - Withdrawal Potential - Risk 0. Patient reports binge drinking on alcohol, with her last reported use being on 8/5/19, at time of intake. Patient reports one time alcohol use the weekend of 9/6/19 and another reported instance of on and off drinking from 10/1/19 to 10/6/19.  She denies any current withdrawal symptoms or concerns. Patient shows no physical signs or symptoms of intoxication or withdrawal. She does not appear to be at risk of withdrawal at this time. Patient is oriented x4.   Specific goals from treatment plan addressed this week:  Patient reports abstinence this week. No withdrawal symptoms observed or reported.   Effectiveness of strategies:  Strategies are effective.   Dimension #2 - Biomedical - Risk 0. Patient denies any current medical issues or concerns. She endorses having a PCP whom she sees for her healthcare needs and concerns. Patient appears able to get her medical needs addressed as necessary. She appears to be in adequate physical health and functioning at this time. Patient was recently diagnosed with COPD.   Specific goals from treatment plan addressed this week:  Patient denies any major medical issues this week.   Effectiveness of strategies:  Strategies are effective.   Dimension #3 -  Emotional/Behavioral/Cognitive - Risk 2. Patient reports mental health diagnoses of depression, anxiety, and PTSD. Patient completed Brief DA with KATHERIN Gardner, DINORA at onset of the current treatment episode and was diagnosed with major depressive disorder, recurrent, moderate and PTSD. She reports medication to help manage her symptoms, however reports she does not consistently take it when she is drinking. Patient reports that she does currently have case management services through the [facility] as well as therapy. Patient denies any current suicidal or homicidal thoughts or plans.  Active interventions to stabilize mental health symptoms:  Meds, psychotherapy  Specific goals from treatment plan addressed this week:  Patient reports relieved and grateful this week. She states that her sister's surgery went well. Patient reports medication compliance. Denies SI/HI/SIB. Patient reports using coping skills including: cooking, cleaning, talking to a friend, keeping busy, staying connected, and taking naps when tired. Patient states that she is working on using HALT when necessary.       Effectiveness of strategies:  Strategies are effective.   Dimension #4 - Readiness for Change - Risk 0. Patient reports she is here for treatment on her own regard. Patient verbalizes that she feels she has a problem, and that she has a desire to change. Patient verbalizes willingness to follow treatment recommendations. She appears genuine in her motivation for treatment at this time. She is blending well with peers and has connections with other women her age in the group.   Specific goals from treatment plan addressed this week:  Patient attended 2/3 groups this week. Patient attended 0 individual session this week. Patient actively participated in group discussions when present.   Effectiveness of strategies:  Strategies are effective.  Dimension #5 - Relapse Potential - Risk 3. Patient reports some treatment experience, and so  appears to have some knowledge of addiction and recovery. She appears to lack consistency in using healthy coping skills as evidence by her binge use of alcohol. Patient's mental and lack of support appear to indicate an increased risk of relapse at this time. Patient reports one time alcohol use the weekend of 9/6/19. Patient reports drinking on and off from 10/1/19 to 10/6/19.  Specific goals from treatment plan addressed this week:  Patient denies any substance use this week and further denies experiencing any urges or cravings to drink. Patient reports that her stress level has decreased this week and attributes that to the absence of any urges.   Effectiveness of strategies:  Strategies appear effective.   Dimension #6 - Recovery Environment - Risk 3. Patient is currently unemployed, and so lack structured activity for her daily life. She was able to identify hobbies she enjoys, however does not appear to engage in those things when she is drinking. Patient reports a stable and supportive place to live currently. She reports that she lacks support for her sobriety as most of her family drinks, and she doesn't have supportive friends at this time. Patient denies any current legal involvement.   Specific goals from treatment plan addressed this week:  Patient reports going to a sewing class and enjoying it. She also reports going to a movie with her son.   Effectiveness of strategies:  Strategies are effective.   T) Treatment plan updated: No  Patient notified and in agreement: No  Patient educated on Recovery Support. Patient has completed 54 of 90 program hours at this time. Patient is currently in phase 1. Projected discharge date is 11/13/29. Current discharge plan is Phase 3.      BRITTANI Rowe, [facility]  9/19/2019, 10:38 AM        Weekly Educational Topics Date   1. Dual Diagnoses, week 1 8/26, 8/28, 8/30   2. Dual Diagnoses, week 2     3. Stress Management 8/14,16,19,21   4. Feelings/Emotions 9/11,  9/13   5. Thinking 9/16, 9/18   6. Change 9/23, 9/25, 9/27   7. Recovery Support 10/14, 10/16   8. Relationships/Communication     9. Addiction 101     10. Relapse Prevention 10/9   11. Grief and Loss     12. Strengths     13. Wellness

## 2021-06-02 NOTE — PROGRESS NOTES
Terrie Mendez attended 3 hours of group today.     The group topic was Recovery Support, patient was responsive to topic.     Patient's engagement in the group session: high     Total group size: 12      DINORA Gardner  10/14/2019, 12:15 PM

## 2021-06-03 NOTE — PROGRESS NOTES
Weekly Progress Note  Terrie Mendez  1960  [id number]        D) Pt attended 1 of 3 groups this week with 2 unexcused absences on 11/4 and 11/6. Patient attended 0 individual sessions this week, however, one was scheduled for 11/4. A) Staff facilitated groups and reviewed tx progress. Assessed for [facility]. R) No VAP needed at this time.      Any significant events, defines as events that impact patients relationship with others inside and outside of treatment: Yes, patient reports frustration with [facility].   Indicate any changes or monitoring of physical or mental health problems: No  Indicate involvement by any outside supports: No  IAPP reviewed and modified as needed: NA     Pt working on the following dimensions:  Dimension #1 - Withdrawal Potential - Risk 0. Patient reports binge drinking on alcohol, with her last reported use being on 8/5/19, at time of intake. Patient reports one time alcohol use the weekend of 9/6/19 and another reported instance of on and off drinking from 10/1/19 to 10/6/19.  She denies any current withdrawal symptoms or concerns. Patient shows no physical signs or symptoms of intoxication or withdrawal. She does not appear to be at risk of withdrawal at this time. Patient is oriented x4.   Specific goals from treatment plan addressed this week:  Patient reports abstinence this week. No withdrawal symptoms observed or reported.   Effectiveness of strategies:  Strategies appear effective.   Dimension #2 - Biomedical - Risk 0. Patient denies any current medical issues or concerns. She endorses having a PCP whom she sees for her healthcare needs and concerns. Patient appears able to get her medical needs addressed as necessary. She appears to be in adequate physical health and functioning at this time. Patient was recently diagnosed with COPD.   Specific goals from treatment plan addressed this week:  Patient denies any major medical issues this week.   Effectiveness of strategies:  Strategies  appear effective.   Dimension #3 - Emotional/Behavioral/Cognitive - Risk 2. Patient reports mental health diagnoses of depression, anxiety, and PTSD. Patient completed Brief DA with KATHERIN Gardner, [facility] at onset of the current treatment episode and was diagnosed with major depressive disorder, recurrent, moderate and PTSD. She reports medication to help manage her symptoms, however reports she does not consistently take it when she is drinking. Patient reports that she does currently have case management services through the [facility] as well as therapy. Patient denies any current suicidal or homicidal thoughts or plans. Patient reports struggling with depressive symptoms throughout the current treatment episode; patient is working to identify coping skills that are effective in addressing depression.   Active interventions to stabilize mental health symptoms:  Meds, psychotherapy  Specific goals from treatment plan addressed this week:  Patient reports feeling content throughout the week and that getting a good nights sleep was helpful in improving her mood. Patient reports medication compliance. Denies SI/HI/SIB. Patient reports using coping skills including: cleaning and taking her meds  Effectiveness of strategies:  Strategies appear effective.   Dimension #4 - Readiness for Change - Risk 0. Patient reports she is here for treatment on her own regard. Patient verbalizes that she feels she has a problem, and that she has a desire to change. Patient verbalizes willingness to follow treatment recommendations. She appears genuine in her motivation for treatment at this time. She is blending well with peers and has connections with other women her age in the group.   Specific goals from treatment plan addressed this week:  Patient attended 1/3 groups this week, two absences were unexcused. Patient attended 0 individual session this week, however, one was scheduled for 11/4. Patient continues to actively participate  in group sessions when present.   Effectiveness of strategies:  Strategies are emerging, patient is struggling with attendance this week and has not called when absent. Patient is aware of the attendance policy and may be discharged AWOL if she does not present to group.   Dimension #5 - Relapse Potential - Risk 3. Patient reports some treatment experience, and so appears to have some knowledge of addiction and recovery. She appears to lack consistency in using healthy coping skills as evidence by her binge use of alcohol. Patient's mental and lack of support appear to indicate an increased risk of relapse at this time. Patient reports one time alcohol use the weekend of 9/6/19. Patient reports drinking on and off from 10/1/19 to 10/6/19.  Specific goals from treatment plan addressed this week:  Patient denies any substance use this week and further denies experiencing any urges or cravings to drink.   Effectiveness of strategies:  Strategies appear effective.   Dimension #6 - Recovery Environment - Risk 3. Patient is currently unemployed, and so lack structured activity for her daily life. She was able to identify hobbies she enjoys, however does not appear to engage in those things when she is drinking. Patient reports a stable and supportive place to live currently. She reports that she lacks support for her sobriety as most of her family drinks, and she doesn't have supportive friends at this time. Patient denies any current legal involvement.   Specific goals from treatment plan addressed this week:  Patient reports spending time with her family and cooking food.   Effectiveness of strategies:  Strategies appear effective.   T) Treatment plan updated: No  Patient notified and in agreement: No  Patient educated on Grief and Loss. Patient has completed 63 of 90 program hours at this time. Patient is currently in phase 1. Projected discharge date is 12/13/19. Current discharge plan is Phase 3.      Vita POMPA  BRITTANI Delgado, [facility]  9/19/2019, 10:38 AM        Weekly Educational Topics Date   1. Dual Diagnoses, week 1 8/26, 8/28, 8/30   2. Dual Diagnoses, week 2     3. Stress Management 8/14,16,19,21   4. Feelings/Emotions 9/11, 9/13   5. Thinking 9/16, 9/18   6. Change 9/23, 9/25, 9/27   7. Recovery Support 10/14, 10/16, 10/18   8. Relationships/Communication     9. Addiction 101     10. Relapse Prevention 10/9   11. Grief and Loss 10/30    12. Strengths 11/1    13. Wellness

## 2021-06-03 NOTE — PROGRESS NOTES
OUTPATIENT SUBSTANCE USE DISORDER TREATMENT  DISCHARGE SUMMARY      Name:  Terrie Mendez   :  BRITTANI Rowe, [facility]   Admit Date: 2019   Discharge Date: 2019   :  1960   Hours Completed: 63   Initial Diagnosis:  Alcohol Use Disorder, severe (F10.20)   Final Diagnosis:  Alcohol Use Disorder, severe (F10.20)   Discharge Address:    [address] [place] [address]   Funding Source:    [employer]     Discharge Type:  Absent Without Leave (AWOL)    Client was receiving residential services at the time of discharge:   No      Reasons for and circumstances of service termination:  Patient was absent from group without communication on 2019, 2019, and 2019. Patient had previously had several absences in a row and was made aware of the attendance policy and was told that three unexcused absences could result in discharge from programming. Patient was discharged on 2019 due to non-compliance with attendance policy.        If program discharge status was At Staff Request, the license jackson must identify the following:    Other interested parties conferred with: not applicable    Referrals provided: not applicable    Alternatives considered and attempted before deciding to discharge:  not applicable      Dimension/Course of Treatment/Individualized Care:   1.  Withdrawal Potential - Intake Risk level -  0 Discharge Risk level - 0  Narrative supporting risk description:  Patient reports binge drinking on alcohol, with her last reported use being on 19, at time of intake. Patient reports one time alcohol use the weekend of 19 and another reported instance of on and off drinking from 10/1/19 to 10/6/19.  She denies any current withdrawal symptoms or concerns. Patient shows no physical signs or symptoms of intoxication or withdrawal.   Treatment plan goals and progress towards those goals:  Patient self-reported two instances of alcohol use  throughout treatment program. Due to patient's absence writer is unsure if patient has had any further instances of alcohol use since beginning of October.      2.  Biomedical Conditions and Complications - Intake Risk level -  0 Discharge Risk level - 1  Narrative supporting risk description:  Patient denies any current medical issues or concerns. She endorses having a PCP whom she sees for her healthcare needs and concerns. Patient appears able to get her medical needs addressed as necessary. She appears to be in adequate physical health and functioning at this time. Patient was recently diagnosed with COPD and prescribed medications to help manage.   Treatment plan goals and progress towards those goals:  Patient had several doctor appointments throughout the course of treatment and was diagnosed with COPD.      3.  Emotional/Behavioral/Cognitive Conditions and Complications - Intake Risk level -  2 Discharge Risk level - 2  Narrative supporting risk description:  Patient reports mental health diagnoses of depression, anxiety, and PTSD. Patient completed Brief DA with KATHERIN Gardner LADC at onset of the current treatment episode and was diagnosed with major depressive disorder, recurrent, moderate and PTSD. She reports medication to help manage her symptoms, however reports she does not consistently take it when she is drinking. Patient reports that she does currently have case management services through the [facility] as well as therapy. Patient denies any current suicidal or homicidal thoughts or plans. Patient reports struggling with depressive symptoms throughout the current treatment episode.  Treatment plan goals and progress towards those goals:  Patient worked to identify coping skills to utilize when depressed as well as activities she could engage in to keep herself active and connected with others. Patient struggled to consistently utilize coping skills due to the use of alcohol to cope with  difficult emotions.      4.  Readiness for Change - Intake Risk level -  0 Discharge Risk level - 2  Narrative supporting risk description:  Patient reports she is here for treatment on her own regard. Patient verbalizes that she feels she has a problem, and that she has a desire to change. Patient verbalizes willingness to follow treatment recommendations. She appears genuine in her motivation for treatment at this time. She is blending well with peers and has connections with other women her age in the group. Patient struggled to attend group consistently due to two periods of several unexcused absences.   Treatment plan goals and progress towards those goals:  Patient identified both internal and external motivation for recovery. She identified changes that needed to be made to support both mental health and chemical health recovery, however, struggled on follow through. Patient was discharged due to non-compliance with attendance policy.     5.  Relapse/Continued Use/Continued Problem Potential - Intake Risk level -  3 Discharge Risk level - 3  Narrative supporting risk description:  Patient reports some treatment experience, and so appears to have some knowledge of addiction and recovery. She appears to lack consistency in using healthy coping skills as evidence by her binge use of alcohol. Patient's mental and lack of support appear to indicate an increased risk of relapse at this time. Patient reports one time alcohol use the weekend of 9/6/19. Patient reports drinking on and off from 10/1/19 to 10/6/19.  Treatment plan goals and progress towards those goals:  Patient had two instances of alcohol use during course of treatment. Patient inconsistently applied relapse prevention skills and attempted to self-medicate difficult emotions with alcohol use.      6.  Recovery Environment - Intake Risk level -  3 Discharge Risk level - 3  Narrative supporting risk description:  Patient is currently unemployed, and so  lack structured activity for her daily life. She was able to identify hobbies she enjoys, however does not appear to engage in those things when she is drinking. Patient reports a stable and supportive place to live currently. She reports that she lacks support for her sobriety as most of her family drinks, and she doesn't have supportive friends at this time. Patient denies any current legal involvement.   Treatment plan goals and progress towards those goals:  Patient worked to identify ways to increase weekly routine including attending Jain, going to yoga class, and attending SEED classes. patient struggled to actively engage in these activities.      Strengths: empathetic, insightful, strong  Needs: additional substance use disorder treatment services, relapse prevention skills   Services Provided: Intake, assessment, treatment planning, education, group discussion, film, lectures, 1x1 therapy, and recommendations at discharge.        Program Involvement: Good  Attendance: Fair  Ability to relate in group/   Other program activities: Good  Assignment Completion: Good  Overall Behavior: Good  Reported Family/Significant   Other Involvement: NA    Prognosis: Poor      Recommendations       Patient is recommended to re-engage in substance use disorder treatment services, however, is in need of a new Rule 25 Assessment to determine what level of care is appropriate due to lack of current clinical information.     Mental Health Referral  Individual Therapy    Physical Health Referral:    Follow up with PCP as needed    Counselor Name and Title:  BRITTANI Rowe, [facility]        Date:  11/11/2019  Time:  2:36 PM
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
## 2023-08-21 ENCOUNTER — TRANSITIONAL CARE UNIT VISIT (OUTPATIENT)
Dept: GERIATRICS | Facility: CLINIC | Age: 63
End: 2023-08-21
Payer: COMMERCIAL

## 2023-08-21 VITALS
SYSTOLIC BLOOD PRESSURE: 127 MMHG | OXYGEN SATURATION: 98 % | WEIGHT: 97.7 LBS | HEART RATE: 95 BPM | TEMPERATURE: 97.3 F | DIASTOLIC BLOOD PRESSURE: 80 MMHG | RESPIRATION RATE: 16 BRPM

## 2023-08-21 DIAGNOSIS — F10.20 ALCOHOL USE DISORDER, SEVERE, DEPENDENCE (H): ICD-10-CM

## 2023-08-21 DIAGNOSIS — S42.202S CLOSED FRACTURE OF PROXIMAL END OF LEFT HUMERUS, UNSPECIFIED FRACTURE MORPHOLOGY, SEQUELA: Primary | ICD-10-CM

## 2023-08-21 DIAGNOSIS — D53.9 MACROCYTIC ANEMIA: ICD-10-CM

## 2023-08-21 DIAGNOSIS — K59.01 SLOW TRANSIT CONSTIPATION: ICD-10-CM

## 2023-08-21 DIAGNOSIS — S42.201S CLOSED FRACTURE OF PROXIMAL END OF RIGHT HUMERUS, UNSPECIFIED FRACTURE MORPHOLOGY, SEQUELA: ICD-10-CM

## 2023-08-21 DIAGNOSIS — L29.9 ITCHING: ICD-10-CM

## 2023-08-21 DIAGNOSIS — F43.12 CHRONIC POST-TRAUMATIC STRESS DISORDER (PTSD): ICD-10-CM

## 2023-08-21 PROCEDURE — 99310 SBSQ NF CARE HIGH MDM 45: CPT | Performed by: NURSE PRACTITIONER

## 2023-08-21 RX ORDER — HYDROXYZINE PAMOATE 25 MG/1
25 CAPSULE ORAL EVERY 6 HOURS PRN
Start: 2023-08-21 | End: 2023-09-07

## 2023-08-21 RX ORDER — DULOXETIN HYDROCHLORIDE 60 MG/1
60 CAPSULE, DELAYED RELEASE ORAL DAILY
COMMUNITY
Start: 2023-03-21

## 2023-08-21 RX ORDER — VITAMIN B COMPLEX
1000 TABLET ORAL DAILY
COMMUNITY
Start: 2023-03-21

## 2023-08-21 RX ORDER — ACETAMINOPHEN 500 MG
500 TABLET ORAL 4 TIMES DAILY
COMMUNITY
Start: 2023-08-18 | End: 2023-09-27 | Stop reason: ALTCHOICE

## 2023-08-21 RX ORDER — LIDOCAINE 4 G/G
PATCH TOPICAL EVERY 24 HOURS
COMMUNITY
Start: 2023-08-18 | End: 2023-08-28

## 2023-08-21 RX ORDER — ALBUTEROL SULFATE 90 UG/1
2 AEROSOL, METERED RESPIRATORY (INHALATION) EVERY 4 HOURS PRN
COMMUNITY
Start: 2023-01-26

## 2023-08-21 RX ORDER — SENNOSIDES A AND B 8.6 MG/1
8.6 TABLET, FILM COATED ORAL 2 TIMES DAILY PRN
COMMUNITY
Start: 2023-08-18

## 2023-08-21 RX ORDER — BUDESONIDE AND FORMOTEROL FUMARATE DIHYDRATE 160; 4.5 UG/1; UG/1
1 AEROSOL RESPIRATORY (INHALATION)
COMMUNITY
Start: 2023-03-21

## 2023-08-21 RX ORDER — NICOTINE 21 MG/24HR
1 PATCH, TRANSDERMAL 24 HOURS TRANSDERMAL EVERY 24 HOURS
COMMUNITY
Start: 2023-08-18 | End: 2023-09-06

## 2023-08-21 RX ORDER — TRAZODONE HYDROCHLORIDE 100 MG/1
100 TABLET ORAL AT BEDTIME
COMMUNITY
Start: 2023-03-21

## 2023-08-21 RX ORDER — LEVOTHYROXINE SODIUM 50 UG/1
50 TABLET ORAL DAILY
COMMUNITY
Start: 2023-03-21

## 2023-08-21 RX ORDER — HYDROMORPHONE HYDROCHLORIDE 2 MG/1
2-4 TABLET ORAL EVERY 4 HOURS PRN
Qty: 120 TABLET | Refills: 0 | Status: SHIPPED | OUTPATIENT
Start: 2023-08-21 | End: 2023-09-06

## 2023-08-21 RX ORDER — POLYETHYLENE GLYCOL 3350
17 POWDER (GRAM) MISCELLANEOUS DAILY
COMMUNITY
Start: 2023-08-18

## 2023-08-21 RX ORDER — FOLIC ACID 1 MG/1
1 TABLET ORAL DAILY
COMMUNITY
Start: 2023-03-21

## 2023-08-21 RX ORDER — OXYCODONE HYDROCHLORIDE 5 MG/1
5 TABLET ORAL EVERY 6 HOURS PRN
COMMUNITY
Start: 2023-08-17 | End: 2023-08-21 | Stop reason: ALTCHOICE

## 2023-08-21 NOTE — PROGRESS NOTES
[facility] GERIATRICS    PRIMARY CARE PROVIDER AND CLINIC:  Maria Da Silva NP  Chief Complaint   Patient presents with    Hospital F/U      [place] Medical Record Number:  [id number]  Place of Service where encounter took place:  [facility] (U) [[id number]]    Terrie Mendez  is a 62 year old  (1960), admitted to the above facility from  [facility]. Hospital stay 8/14/23 through 8/18/23. Patient with PMH osteoporosis, right femur fracture 1/18/23, alcohol use disorder, depression, PTSD, malnutrition, insomnia, and hypothyroidism. She was seen in the ED 8/10/23 after a fall, found to have a left proximal humerus fracture. Treated non-operatively. She declined TCU and discharged home. On 8/14/23 she presented after another fall, now diagnosed with right humerus fracture. On 8/15/23 she underwent IM nailing of the right fracture.     HPI obtained from patient visit, review of nursing home record, discussion with facility staff, and Epic review. Her mental health  is present for visit as well.     HPI:    Patient has 2 main concerns today, her pain is not controlled and she feels continually itchy on her arms and head. She has been taking the oxycodone for pain, she does not notice any effect from this. Her pain has been constant and in the 7-10 range. The itching started in the hospital. No rashes or lesions. She does not want to take Benadryl. Her bowels are moving with the senna and miralax. Several minutes are spent discussing various issues with the facility, but she overall feels more satisfied here than other places she has been.    CODE STATUS/ADVANCE DIRECTIVES DISCUSSION:  Full Code    ALLERGIES: No Known Allergies   PAST MEDICAL HISTORY: No past medical history on file.   PAST SURGICAL HISTORY:   has no past surgical history on file.  FAMILY HISTORY: family history is not on file.  SOCIAL HISTORY:     Patient's living condition: lives alone    Post Discharge Medication  Reconciliation Status:   MED REC REQUIRED  Post Medication Reconciliation Status:  Discharge medications reconciled and changed, see notes/orders       Current Outpatient Medications   Medication Sig    acetaminophen (TYLENOL) 500 MG tablet Take 500 mg by mouth 4 times daily    albuterol (PROAIR HFA/PROVENTIL HFA/VENTOLIN HFA) 108 (90 Base) MCG/ACT inhaler Inhale 2 puffs into the lungs every 4 hours as needed    budesonide-formoterol (SYMBICORT) 160-4.5 MCG/ACT Inhaler Inhale 1 puff into the lungs two times daily    DULoxetine (CYMBALTA) 60 MG capsule Take 60 mg by mouth daily    folic acid (FOLVITE) 1 MG tablet Take 1 mg by mouth daily    levothyroxine (SYNTHROID/LEVOTHROID) 50 MCG tablet Take 50 mcg by mouth daily    Lidocaine (LIDOCARE) 4 % Patch every 24 hours    magnesium chloride 535 (64 Mg) MG TBEC CR tablet Take 2 tablets by mouth daily    menthol-zinc oxide (CALMOSEPTINE) 0.44-20.6 % OINT ointment Apply topically daily    nicotine (NICODERM CQ) 21 MG/24HR 24 hr patch Place 1 patch onto the skin every 24 hours    oxyCODONE (ROXICODONE) 5 MG tablet Take 5 mg by mouth every 6 hours as needed    polyethylene glycol 3350 POWD Take 17 g by mouth daily    senna (SENOKOT) 8.6 MG tablet Take 8.6 mg by mouth 2 times daily as needed    traZODone (DESYREL) 100 MG tablet Take 100 mg by mouth At Bedtime    Vitamin D3 (CHOLECALCIFEROL) 25 mcg (1000 units) tablet Take 1,000 Units by mouth daily     No current facility-administered medications for this visit.       ROS:  4 point ROS including Respiratory, CV, GI and , other than that noted in the HPI,  is negative    Vitals:  /80   Pulse 95   Temp 97.3  F (36.3  C)   Resp 16   Wt 44.3 kg (97 lb 11.2 oz)   SpO2 98%   Exam:  GENERAL APPEARANCE:  Alert, in no distress, thin  ENT:  Mouth and posterior oropharynx normal, moist mucous membranes  EYES:  EOM normal, conjunctiva and lids normal  RESP:  no respiratory distress  CV:  no edema  M/S:   left arm in sling, no  swelling or bruising noted, right arm in ace wrap and immobilizer  PSYCH:  oriented X 3, affect abnormal flat    Lab/Diagnostic data:  Recent labs in [facility] reviewed by me today.       ASSESSMENT/PLAN:  (S42.202S) Closed fracture of proximal end of left humerus, unspecified fracture morphology, sequela  (primary encounter diagnosis)  (S42.201S) Closed fracture of proximal end of right humerus, unspecified fracture morphology, sequela  Comment: Mobility will be very challenging due to NWB on both arms. Pain is not controlled. Itching is likely due to oxycodone, will try switching to hydromorphone. She is a fall risk, but given that she is in a controlled environment, will order hydromorphone 2-4mg for now. If she develops sedation, dizziness, confusion, will decrease dose.   Plan: Discontinue oxycodone. Hydromorphone 2-4mg q4h prn. PT/OT eval and treat, discharge planning per their recommendation. F/u ortho as scheduled.    (L29.9) Itching  Comment: Likely due to oxycodone. No rash.   Plan: Hydroxyzine 25mg q6h prn, which will also help with anxiety    (K59.01) Slow transit constipation  Comment: Due to medications, low activity level. Advised patient that this could worsen with increased dose of opioids.   Plan: Continue Miralax and Senna. Monitor bowel function. Adjust medication as clinically indicated.    (D53.9) Macrocytic anemia  Comment: Mild, due to blood loss from injuries and surgery  Plan: Hgb prn    (F10.20) Alcohol use disorder, severe, dependence (H)  Comment: She will not have access to alcohol while at TCU, this should lessen her fall risk    (F43.12) Chronic post-traumatic stress disorder (PTSD)  Comment: Chronic condition being managed with medications, behavioral therapy, and frequent assessments.  Plan: Continue current POC with no changes at this time and adjustments as needed.      Orders:  Discontinue oxycodone  Hydromorphone 2-4mg q4h prn  Hydroxyzine 25mg q6h prn      Total time spent with  patient visit at the skilled nursing facility was 45 min including patient visit and review of past records.     Electronically signed by:  ANA Peterson CNP

## 2023-08-21 NOTE — LETTER
8/21/2023        RE: Terrie Mendez  [address]   [address]  [place] [address]        [facility] GERIATRICS    PRIMARY CARE PROVIDER AND CLINIC:  Maria Da Silva NP  Chief Complaint   Patient presents with     [facility] F/U      [place] Medical Record Number:  [id number]  Place of Service where encounter took place:  [facility] (U) [[id number]]    Terrie Mendez  is a 62 year old  (1960), admitted to the above facility from  [facility]. Hospital stay 8/14/23 through 8/18/23. Patient with PMH osteoporosis, right femur fracture 1/18/23, alcohol use disorder, depression, PTSD, malnutrition, insomnia, and hypothyroidism. She was seen in the ED 8/10/23 after a fall, found to have a left proximal humerus fracture. Treated non-operatively. She declined TCU and discharged home. On 8/14/23 she presented after another fall, now diagnosed with right humerus fracture. On 8/15/23 she underwent IM nailing of the right fracture.     HPI obtained from patient visit, review of nursing home record, discussion with facility staff, and Epic review. Her mental health  is present for visit as well.     HPI:    Patient has 2 main concerns today, her pain is not controlled and she feels continually itchy on her arms and head. She has been taking the oxycodone for pain, she does not notice any effect from this. Her pain has been constant and in the 7-10 range. The itching started in the hospital. No rashes or lesions. She does not want to take Benadryl. Her bowels are moving with the senna and miralax. Several minutes are spent discussing various issues with the facility, but she overall feels more satisfied here than other places she has been.    CODE STATUS/ADVANCE DIRECTIVES DISCUSSION:  Full Code    ALLERGIES: No Known Allergies   PAST MEDICAL HISTORY: No past medical history on file.   PAST SURGICAL HISTORY:   has no past surgical history on file.  FAMILY HISTORY: family history is not on  file.  SOCIAL HISTORY:     Patient's living condition: lives alone    Post Discharge Medication Reconciliation Status:   MED REC REQUIRED  Post Medication Reconciliation Status:  Discharge medications reconciled and changed, see notes/orders       Current Outpatient Medications   Medication Sig     acetaminophen (TYLENOL) 500 MG tablet Take 500 mg by mouth 4 times daily     albuterol (PROAIR HFA/PROVENTIL HFA/VENTOLIN HFA) 108 (90 Base) MCG/ACT inhaler Inhale 2 puffs into the lungs every 4 hours as needed     budesonide-formoterol (SYMBICORT) 160-4.5 MCG/ACT Inhaler Inhale 1 puff into the lungs two times daily     DULoxetine (CYMBALTA) 60 MG capsule Take 60 mg by mouth daily     folic acid (FOLVITE) 1 MG tablet Take 1 mg by mouth daily     levothyroxine (SYNTHROID/LEVOTHROID) 50 MCG tablet Take 50 mcg by mouth daily     Lidocaine (LIDOCARE) 4 % Patch every 24 hours     magnesium chloride 535 (64 Mg) MG TBEC CR tablet Take 2 tablets by mouth daily     menthol-zinc oxide (CALMOSEPTINE) 0.44-20.6 % OINT ointment Apply topically daily     nicotine (NICODERM CQ) 21 MG/24HR 24 hr patch Place 1 patch onto the skin every 24 hours     oxyCODONE (ROXICODONE) 5 MG tablet Take 5 mg by mouth every 6 hours as needed     polyethylene glycol 3350 POWD Take 17 g by mouth daily     senna (SENOKOT) 8.6 MG tablet Take 8.6 mg by mouth 2 times daily as needed     traZODone (DESYREL) 100 MG tablet Take 100 mg by mouth At Bedtime     Vitamin D3 (CHOLECALCIFEROL) 25 mcg (1000 units) tablet Take 1,000 Units by mouth daily     No current facility-administered medications for this visit.       ROS:  4 point ROS including Respiratory, CV, GI and , other than that noted in the HPI,  is negative    Vitals:  /80   Pulse 95   Temp 97.3  F (36.3  C)   Resp 16   Wt 44.3 kg (97 lb 11.2 oz)   SpO2 98%   Exam:  GENERAL APPEARANCE:  Alert, in no distress, thin  ENT:  Mouth and posterior oropharynx normal, moist mucous membranes  EYES:  EOM  normal, conjunctiva and lids normal  RESP:  no respiratory distress  CV:  no edema  M/S:   left arm in sling, no swelling or bruising noted, right arm in ace wrap and immobilizer  PSYCH:  oriented X 3, affect abnormal flat    Lab/Diagnostic data:  Recent labs in [facility] reviewed by me today.       ASSESSMENT/PLAN:  (S42.202S) Closed fracture of proximal end of left humerus, unspecified fracture morphology, sequela  (primary encounter diagnosis)  (S42.201S) Closed fracture of proximal end of right humerus, unspecified fracture morphology, sequela  Comment: Mobility will be very challenging due to NWB on both arms. Pain is not controlled. Itching is likely due to oxycodone, will try switching to hydromorphone. She is a fall risk, but given that she is in a controlled environment, will order hydromorphone 2-4mg for now. If she develops sedation, dizziness, confusion, will decrease dose.   Plan: Discontinue oxycodone. Hydromorphone 2-4mg q4h prn. PT/OT eval and treat, discharge planning per their recommendation. F/u ortho as scheduled.    (L29.9) Itching  Comment: Likely due to oxycodone. No rash.   Plan: Hydroxyzine 25mg q6h prn, which will also help with anxiety    (K59.01) Slow transit constipation  Comment: Due to medications, low activity level. Advised patient that this could worsen with increased dose of opioids.   Plan: Continue Miralax and Senna. Monitor bowel function. Adjust medication as clinically indicated.    (D53.9) Macrocytic anemia  Comment: Mild, due to blood loss from injuries and surgery  Plan: Hgb prn    (F10.20) Alcohol use disorder, severe, dependence (H)  Comment: She will not have access to alcohol while at TCU, this should lessen her fall risk    (F43.12) Chronic post-traumatic stress disorder (PTSD)  Comment: Chronic condition being managed with medications, behavioral therapy, and frequent assessments.  Plan: Continue current POC with no changes at this time and adjustments as  needed.      Orders:  Discontinue oxycodone  Hydromorphone 2-4mg q4h prn  Hydroxyzine 25mg q6h prn      Total time spent with patient visit at the skilled nursing facility was 45 min including patient visit and review of past records.     Electronically signed by:  ANA Peterson CNP                   Sincerely,        ANA Peterson CNP

## 2023-08-23 ENCOUNTER — TRANSITIONAL CARE UNIT VISIT (OUTPATIENT)
Dept: GERIATRICS | Facility: CLINIC | Age: 63
End: 2023-08-23
Payer: COMMERCIAL

## 2023-08-23 VITALS
OXYGEN SATURATION: 96 % | WEIGHT: 97.7 LBS | HEART RATE: 90 BPM | RESPIRATION RATE: 18 BRPM | BODY MASS INDEX: 17.31 KG/M2 | HEIGHT: 63 IN | TEMPERATURE: 97.8 F | SYSTOLIC BLOOD PRESSURE: 136 MMHG | DIASTOLIC BLOOD PRESSURE: 93 MMHG

## 2023-08-23 DIAGNOSIS — S42.201S CLOSED FRACTURE OF PROXIMAL END OF RIGHT HUMERUS, UNSPECIFIED FRACTURE MORPHOLOGY, SEQUELA: Primary | ICD-10-CM

## 2023-08-23 DIAGNOSIS — S42.202S CLOSED FRACTURE OF PROXIMAL END OF LEFT HUMERUS, UNSPECIFIED FRACTURE MORPHOLOGY, SEQUELA: ICD-10-CM

## 2023-08-23 PROBLEM — F10.930 ALCOHOL WITHDRAWAL SEIZURE WITHOUT COMPLICATION (H): Status: ACTIVE | Noted: 2019-04-25

## 2023-08-23 PROBLEM — R56.9 ALCOHOL WITHDRAWAL SEIZURE WITHOUT COMPLICATION (H): Status: ACTIVE | Noted: 2019-04-25

## 2023-08-23 PROCEDURE — 99308 SBSQ NF CARE LOW MDM 20: CPT | Performed by: NURSE PRACTITIONER

## 2023-08-23 NOTE — PROGRESS NOTES
"[facility] GERIATRICS  [facility] Medical Record Number: [id number]  Chief Complaint   Patient presents with    RECHECK     HPI: Terrie Mendez is a 62 year old (1960), who is being seen today for an episodic care visit at: [facility] (U) [[id number]]. [facility]. Hospital stay 8/14/23 through 8/18/23. Patient with PMH osteoporosis, right femur fracture 1/18/23, alcohol use disorder, depression, PTSD, malnutrition, insomnia, and hypothyroidism. She was seen in the ED 8/10/23 after a fall, found to have a left proximal humerus fracture. Treated non-operatively. She declined TCU and discharged home. On 8/14/23 she presented after another fall, now diagnosed with right humerus fracture. On 8/15/23 she underwent IM nailing of the right fracture.     Today's concern is:   Patient is seen briefly in the hallway on her way to therapy. She continues to have itching and is using the hydroxyzine. She thinks the dilaudid might be working better for her pain. Per therapy she is walking 25 feet with no device, transfers with min assist, but is dependent for ADLs due to NWB BUE.     Allergies and PMH/PSH reviewed in EPIC today.    REVIEW OF SYSTEMS:  4 point ROS including Respiratory, CV, GI and , other than that noted in the HPI,  is negative    Objective:   BP (!) 136/93   Pulse 90   Temp 97.8  F (36.6  C)   Resp 18   Ht 1.6 m (5' 3\")   Wt 44.3 kg (97 lb 11.2 oz)   SpO2 96%   BMI 17.31 kg/m    Exam:  GENERAL APPEARANCE:  Alert, in no distress  RESP:  no respiratory distress      Assessment/Plan:  (S42.201S) Closed fracture of proximal end of right humerus, unspecified fracture morphology, sequela  (primary encounter diagnosis)  (S42.202S) Closed fracture of proximal end of left humerus, unspecified fracture morphology, sequela  Comment: Patient will likely need to stay in TCU until she can bear weight on at least 1 arm. Pain is adequately controlled.   Plan: PT/OT eval and treat, discharge " planning per their recommendation. Continue hydromorphone prn for pain, monitor pain severity. F/u ortho as scheduled.      Electronically signed by: ANA Peterson CNP

## 2023-08-23 NOTE — LETTER
"    8/23/2023        RE: Terrie Mendez  [address]   [address]  [place] [address]        [facility] GERIATRICS  [facility] Medical Record Number: [id number]  Chief Complaint   Patient presents with     RECHECK     HPI: Terrie Mendez is a 62 year old (1960), who is being seen today for an episodic care visit at: [facility] (U) [[id number]]. [facility]. Hospital stay 8/14/23 through 8/18/23. Patient with PMH osteoporosis, right femur fracture 1/18/23, alcohol use disorder, depression, PTSD, malnutrition, insomnia, and hypothyroidism. She was seen in the ED 8/10/23 after a fall, found to have a left proximal humerus fracture. Treated non-operatively. She declined TCU and discharged home. On 8/14/23 she presented after another fall, now diagnosed with right humerus fracture. On 8/15/23 she underwent IM nailing of the right fracture.     Today's concern is:   Patient is seen briefly in the hallway on her way to therapy. She continues to have itching and is using the hydroxyzine. She thinks the dilaudid might be working better for her pain. Per therapy she is walking 25 feet with no device, transfers with min assist, but is dependent for ADLs due to NWB BUE.     Allergies and PMH/PSH reviewed in [facility] today.    REVIEW OF SYSTEMS:  4 point ROS including Respiratory, CV, GI and , other than that noted in the HPI,  is negative    Objective:   BP (!) 136/93   Pulse 90   Temp 97.8  F (36.6  C)   Resp 18   Ht 1.6 m (5' 3\")   Wt 44.3 kg (97 lb 11.2 oz)   SpO2 96%   BMI 17.31 kg/m    Exam:  GENERAL APPEARANCE:  Alert, in no distress  RESP:  no respiratory distress      Assessment/Plan:  (S42.201S) Closed fracture of proximal end of right humerus, unspecified fracture morphology, sequela  (primary encounter diagnosis)  (S42.202S) Closed fracture of proximal end of left humerus, unspecified fracture morphology, sequela  Comment: Patient will likely need to stay in TCU until she can bear " weight on at least 1 arm. Pain is adequately controlled.   Plan: PT/OT eval and treat, discharge planning per their recommendation. Continue hydromorphone prn for pain, monitor pain severity. F/u ortho as scheduled.      Electronically signed by: ANA Peterson CNP    Sincerely,        ANA Peterson CNP

## 2023-08-24 NOTE — PROGRESS NOTES
[facility] GERIATRICS  INITIAL VISIT NOTE  August 25, 2023      PRIMARY CARE PROVIDER AND CLINIC:  [facility] - [place]     [facility] Medical Record Number:  [id number]  Place of Service where encounter took place:  [facility] (U) [[id number]]    Chief Complaint   Patient presents with    Hospital F/U       HPI:    Terrie Mendez is a 62 year old  (1960[facility] Medical history is notable for  EtOH use, PTSD, depression and R femur fracture (January). She was seen in the [place] ER on 8/10/23 after a fall and was found to have a left humeral head, surgical neck and proximal shaft fracture. She was discharged with recommendation for outpatient ortho follow up. She was then hospitalized at [place] from 8/14/23 to 8/17/23 where she presented after a fall and was found to have a R humerus fracture for which she is s/p IM nail (8/15/23). She was admitted to this facility for  rehab, medical management, and nursing care.      History obtained from: facility chart records, facility staff, patient report, [facility] chart review, [facility].      Today, Ms. Mendez is seen in her room sitting up in bed. She is antsy and a bit anxious - wants to be more independent. She is a limited historian - BIMS 9/15. Oxycodone changed to hydromorphone on 8/21. Pain control is better. She did not have a sling on nor a pillow under either arm in bed and we talked about how that puts pressure on her arm and a pillow will help to move the weight more to her forearm. No concerns today per discussion with nursing. She is working with therapies.     CODE STATUS: CPR/Full code     ALLERGIES:  No Known Allergies    PAST MEDICAL HISTORY:   EtOH use, PTSD, depression and R femur fracture (January)    PAST SURGICAL HISTORY:   s/p Right hip compression plating 1/19/23     SOCIAL HISTORY:   Patient's living condition: lives  "alone    MEDICATIONS:  Post Discharge Medication Reconciliation Status: discharge medications reconciled and changed, per note/orders.  Current Outpatient Medications   Medication Sig Dispense Refill    acetaminophen (TYLENOL) 500 MG tablet Take 500 mg by mouth 4 times daily      albuterol (PROAIR HFA/PROVENTIL HFA/VENTOLIN HFA) 108 (90 Base) MCG/ACT inhaler Inhale 2 puffs into the lungs every 4 hours as needed      budesonide-formoterol (SYMBICORT) 160-4.5 MCG/ACT Inhaler Inhale 1 puff into the lungs two times daily      DULoxetine (CYMBALTA) 60 MG capsule Take 60 mg by mouth daily      folic acid (FOLVITE) 1 MG tablet Take 1 mg by mouth daily      HYDROmorphone (DILAUDID) 2 MG tablet Take 1-2 tablets (2-4 mg) by mouth every 4 hours as needed for pain 2mg pain 4-6 and 4mg pain 7-10 120 tablet 0    hydrOXYzine (VISTARIL) 25 MG capsule Take 1 capsule (25 mg) by mouth every 6 hours as needed for itching      levothyroxine (SYNTHROID/LEVOTHROID) 50 MCG tablet Take 50 mcg by mouth daily      Lidocaine (LIDOCARE) 4 % Patch every 24 hours      magnesium chloride 535 (64 Mg) MG TBEC CR tablet Take 2 tablets by mouth daily      menthol-zinc oxide (CALMOSEPTINE) 0.44-20.6 % OINT ointment Apply topically daily      nicotine (NICODERM CQ) 21 MG/24HR 24 hr patch Place 1 patch onto the skin every 24 hours      polyethylene glycol 3350 POWD Take 17 g by mouth daily      senna (SENOKOT) 8.6 MG tablet Take 8.6 mg by mouth 2 times daily as needed      traZODone (DESYREL) 100 MG tablet Take 100 mg by mouth At Bedtime      Vitamin D3 (CHOLECALCIFEROL) 25 mcg (1000 units) tablet Take 1,000 Units by mouth daily         ROS:  4 point ROS neg other than the symptoms noted above in the HPI. BIMS 9/15.     PHYSICAL EXAM:  /76   Pulse 75   Temp 97.5  F (36.4  C)   Resp 18   Ht 1.6 m (5' 3\")   Wt 44.3 kg (97 lb 11.2 oz)   SpO2 95%   BMI 17.31 kg/m    Gen: sitting up in bed, alert, cooperative and in no acute distress  Resp: " breathing non labored, no tachypnea   Ext: no LE edema; RUE in ACE wrap, neither UE in a sling; no swelling in hands   Neuro: CX II-XII grossly in tact; ROM in all four extremities grossly in tact  Psych: alert and oriented to self and general situation; anxious  Skin: ecchymoses over L humerus     LABORATORY/IMAGING DATA:  Reviewed as per [facility] and/or [facility]    ASSESSMENT/PLAN:    Left Humerus Fracture (8/9/23)  Secondary to a fall. Conservative management.   -- NWB  -- APAP 500 mg QID, hydromorphone 2-4 mg q4h PRN, lido patch on q12h/off q12h  -- PT/OT  -- follow up with ortho as scheduled    Right Humerus Fracture s/p IM Nail (8/15/23)  Secondary to a fall the day prior.   -- NWB  -- APAP 500 mg QID, hydromorphone 2-4 mg q4h PRN, lido patch on q12h/off q12h  -- PT/OT  -- follow up with ortho as scheduled    Chronic Hyponatremia  ;Has been 128-131 historically. Most recently at [facility], Na 134. This is in setting of EtOH.   -- nutrition following  -- follow clinically     COPD  No signs of exacerbation.  -- budesonide-formoterol 160-4.5 mcg BID and albuterol MDI PRN    Mild Major Recurrent Depression  Mood and spirits seemed OK today - more anxiety.   -- duloxetine 60 mg daily   -- supportive cares    Hypothyroidisim  TSH  5.09 on 8/14.   -- levothyroxine 50 mcg daily     EtOH Abuse vs Dependence  Some anxiety today   -- folic acid 1 mg daily  -- supportive cares     Tobacco Dependence  -- nicoderm 21 mg patch     Insomnia  -- trazodone 100 mg at bedtime     Slow Transit Constipation  -- Miralax 17g daily and Senna 1 tab BID PRN  -- adjust bowel regimen as needed    Recurrent Falls  Physical Deconditioning  In setting of hospitalization and underlying medical conditions  -- ongoing PT/OT      Electronically signed by:  Maryjo Bernabe MD

## 2023-08-25 ENCOUNTER — TRANSITIONAL CARE UNIT VISIT (OUTPATIENT)
Dept: GERIATRICS | Facility: CLINIC | Age: 63
End: 2023-08-25
Payer: COMMERCIAL

## 2023-08-25 VITALS
HEART RATE: 75 BPM | OXYGEN SATURATION: 95 % | TEMPERATURE: 97.5 F | RESPIRATION RATE: 18 BRPM | BODY MASS INDEX: 17.31 KG/M2 | DIASTOLIC BLOOD PRESSURE: 76 MMHG | SYSTOLIC BLOOD PRESSURE: 127 MMHG | WEIGHT: 97.7 LBS | HEIGHT: 63 IN

## 2023-08-25 DIAGNOSIS — S42.294D OTHER CLOSED NONDISPLACED FRACTURE OF PROXIMAL END OF RIGHT HUMERUS WITH ROUTINE HEALING, SUBSEQUENT ENCOUNTER: ICD-10-CM

## 2023-08-25 DIAGNOSIS — F33.0 MILD RECURRENT MAJOR DEPRESSION (H): ICD-10-CM

## 2023-08-25 DIAGNOSIS — S42.215D CLOSED NONDISPLACED FRACTURE OF SURGICAL NECK OF LEFT HUMERUS WITH ROUTINE HEALING, UNSPECIFIED FRACTURE MORPHOLOGY, SUBSEQUENT ENCOUNTER: Primary | ICD-10-CM

## 2023-08-25 DIAGNOSIS — G47.01 INSOMNIA DUE TO MEDICAL CONDITION: ICD-10-CM

## 2023-08-25 DIAGNOSIS — R53.81 PHYSICAL DECONDITIONING: ICD-10-CM

## 2023-08-25 DIAGNOSIS — E87.1 HYPONATREMIA: ICD-10-CM

## 2023-08-25 DIAGNOSIS — J44.9 CHRONIC OBSTRUCTIVE PULMONARY DISEASE, UNSPECIFIED COPD TYPE (H): ICD-10-CM

## 2023-08-25 DIAGNOSIS — R29.6 RECURRENT FALLS: ICD-10-CM

## 2023-08-25 DIAGNOSIS — E03.9 HYPOTHYROIDISM, UNSPECIFIED TYPE: ICD-10-CM

## 2023-08-25 PROCEDURE — 99305 1ST NF CARE MODERATE MDM 35: CPT | Performed by: INTERNAL MEDICINE

## 2023-08-25 NOTE — LETTER
8/25/2023        RE: Terrie Mendez  [address]   [address]  [place] [address]        [facility] GERIATRICS  INITIAL VISIT NOTE  August 25, 2023      PRIMARY CARE PROVIDER AND CLINIC:  [facility] - [place]     [facility] Medical Record Number:  [id number]  Place of Service where encounter took place:  [facility] ([facility] [[id number]]    Chief Complaint   Patient presents with     Hospital F/U       HPI:    Terrie Mendez is a 62 year old  (1960[facility] Medical history is notable for  EtOH use, PTSD, depression and R femur fracture (January). She was seen in the [place] ER on 8/10/23 after a fall and was found to have a left humeral head, surgical neck and proximal shaft fracture. She was discharged with recommendation for outpatient ortho follow up. She was then hospitalized at [place] from 8/14/23 to 8/17/23 where she presented after a fall and was found to have a R humerus fracture for which she is s/p IM nail (8/15/23). She was admitted to this facility for  rehab, medical management, and nursing care.      History obtained from: facility chart records, facility staff, patient report, [facility] chart review, and [facility] chart review.      Today, Ms. Mendez is seen in her room sitting up in bed. She is antsy and a bit anxious - wants to be more independent. She is a limited historian - BIMS 9/15. Oxycodone changed to hydromorphone on 8/21. Pain control is better. She did not have a sling on nor a pillow under either arm in bed and we talked about how that puts pressure on her arm and a pillow will help to move the weight more to her forearm. No concerns today per discussion with nursing. She is working with therapies.     CODE STATUS: CPR/Full code     ALLERGIES:  No Known Allergies    PAST MEDICAL HISTORY:   EtOH use, PTSD, depression and R femur fracture (January)    PAST SURGICAL HISTORY:   s/p Right hip  "compression plating 1/19/23     SOCIAL HISTORY:   Patient's living condition: lives alone    MEDICATIONS:  Post Discharge Medication Reconciliation Status: discharge medications reconciled and changed, per note/orders.  Current Outpatient Medications   Medication Sig Dispense Refill     acetaminophen (TYLENOL) 500 MG tablet Take 500 mg by mouth 4 times daily       albuterol (PROAIR HFA/PROVENTIL HFA/VENTOLIN HFA) 108 (90 Base) MCG/ACT inhaler Inhale 2 puffs into the lungs every 4 hours as needed       budesonide-formoterol (SYMBICORT) 160-4.5 MCG/ACT Inhaler Inhale 1 puff into the lungs two times daily       DULoxetine (CYMBALTA) 60 MG capsule Take 60 mg by mouth daily       folic acid (FOLVITE) 1 MG tablet Take 1 mg by mouth daily       HYDROmorphone (DILAUDID) 2 MG tablet Take 1-2 tablets (2-4 mg) by mouth every 4 hours as needed for pain 2mg pain 4-6 and 4mg pain 7-10 120 tablet 0     hydrOXYzine (VISTARIL) 25 MG capsule Take 1 capsule (25 mg) by mouth every 6 hours as needed for itching       levothyroxine (SYNTHROID/LEVOTHROID) 50 MCG tablet Take 50 mcg by mouth daily       Lidocaine (LIDOCARE) 4 % Patch every 24 hours       magnesium chloride 535 (64 Mg) MG TBEC CR tablet Take 2 tablets by mouth daily       menthol-zinc oxide (CALMOSEPTINE) 0.44-20.6 % OINT ointment Apply topically daily       nicotine (NICODERM CQ) 21 MG/24HR 24 hr patch Place 1 patch onto the skin every 24 hours       polyethylene glycol 3350 POWD Take 17 g by mouth daily       senna (SENOKOT) 8.6 MG tablet Take 8.6 mg by mouth 2 times daily as needed       traZODone (DESYREL) 100 MG tablet Take 100 mg by mouth At Bedtime       Vitamin D3 (CHOLECALCIFEROL) 25 mcg (1000 units) tablet Take 1,000 Units by mouth daily         ROS:  4 point ROS neg other than the symptoms noted above in the HPI. BIMS 9/15.     PHYSICAL EXAM:  /76   Pulse 75   Temp 97.5  F (36.4  C)   Resp 18   Ht 1.6 m (5' 3\")   Wt 44.3 kg (97 lb 11.2 oz)   SpO2 95%  "  BMI 17.31 kg/m    Gen: sitting up in bed, alert, cooperative and in no acute distress  Resp: breathing non labored, no tachypnea   Ext: no LE edema; RUE in ACE wrap, neither UE in a sling; no swelling in hands   Neuro: CX II-XII grossly in tact; ROM in all four extremities grossly in tact  Psych: alert and oriented to self and general situation; anxious  Skin: ecchymoses over L humerus     LABORATORY/IMAGING DATA:  Reviewed as per [facility] and/or [facility]    ASSESSMENT/PLAN:    Left Humerus Fracture (8/9/23)  Secondary to a fall. Conservative management.   -- NWB  -- APAP 500 mg QID, hydromorphone 2-4 mg q4h PRN, lido patch on q12h/off q12h  -- PT/OT  -- follow up with ortho as scheduled    Right Humerus Fracture s/p IM Nail (8/15/23)  Secondary to a fall the day prior.   -- NWB  -- APAP 500 mg QID, hydromorphone 2-4 mg q4h PRN, lido patch on q12h/off q12h  -- PT/OT  -- follow up with ortho as scheduled    Chronic Hyponatremia  ;Has been 128-131 historically. Most recently at [facility], Na 134. This is in setting of EtOH.   -- nutrition following  -- follow clinically     COPD  No signs of exacerbation.  -- budesonide-formoterol 160-4.5 mcg BID and albuterol MDI PRN    Mild Major Recurrent Depression  Mood and spirits seemed OK today - more anxiety.   -- duloxetine 60 mg daily   -- supportive cares    Hypothyroidisim  TSH  5.09 on 8/14.   -- levothyroxine 50 mcg daily     EtOH Abuse vs Dependence  Some anxiety today   -- folic acid 1 mg daily  -- supportive cares     Tobacco Dependence  -- nicoderm 21 mg patch     Insomnia  -- trazodone 100 mg at bedtime     Slow Transit Constipation  -- Miralax 17g daily and Senna 1 tab BID PRN  -- adjust bowel regimen as needed    Recurrent Falls  Physical Deconditioning  In setting of hospitalization and underlying medical conditions  -- ongoing PT/OT      Electronically signed by:  Maryjo Bernabe MD                          Sincerely,        Maryjo Bernabe MD

## 2023-08-28 ENCOUNTER — TRANSITIONAL CARE UNIT VISIT (OUTPATIENT)
Dept: GERIATRICS | Facility: CLINIC | Age: 63
End: 2023-08-28
Payer: COMMERCIAL

## 2023-08-28 VITALS
HEIGHT: 63 IN | WEIGHT: 94.1 LBS | BODY MASS INDEX: 16.67 KG/M2 | HEART RATE: 88 BPM | OXYGEN SATURATION: 95 % | RESPIRATION RATE: 16 BRPM | TEMPERATURE: 96.9 F | SYSTOLIC BLOOD PRESSURE: 146 MMHG | DIASTOLIC BLOOD PRESSURE: 76 MMHG

## 2023-08-28 DIAGNOSIS — S42.215D CLOSED NONDISPLACED FRACTURE OF SURGICAL NECK OF LEFT HUMERUS WITH ROUTINE HEALING, UNSPECIFIED FRACTURE MORPHOLOGY, SUBSEQUENT ENCOUNTER: Primary | ICD-10-CM

## 2023-08-28 DIAGNOSIS — S42.294D OTHER CLOSED NONDISPLACED FRACTURE OF PROXIMAL END OF RIGHT HUMERUS WITH ROUTINE HEALING, SUBSEQUENT ENCOUNTER: ICD-10-CM

## 2023-08-28 PROCEDURE — 99308 SBSQ NF CARE LOW MDM 20: CPT | Performed by: NURSE PRACTITIONER

## 2023-08-28 NOTE — LETTER
"    8/28/2023        RE: Terrie Mendez  [address]   [address]  [place] [address]        [facility] GERIATRICS    Chief Complaint   Patient presents with     RECHECK     HPI:  Terrie Mendez is a 62 year old  (1960), who is being seen today for an episodic care visit at: [facility] (U) [[id number]]. [facility]. Hospital stay 8/14/23 through 8/18/23. Patient with PMH osteoporosis, right femur fracture 1/18/23, alcohol use disorder, depression, PTSD, malnutrition, insomnia, and hypothyroidism. She was seen in the ED 8/10/23 after a fall, found to have a left proximal humerus fracture. Treated non-operatively. She declined TCU and discharged home. On 8/14/23 she presented after another fall, now diagnosed with right humerus fracture. On 8/15/23 she underwent IM nailing of the right fracture.     Today's concern is:   Patient is sitting in the wheelchair, does not have a sling on either arm. She declines offer to help don these. She isn't sure if the hydromorphone does much for her pain. She does not see ortho until 9/7. She sometimes uses her arms to move around in bed or get up. She does still have some intermittent itching. Most of the visit is spent listening to her concerns about staff. She feels that they try to rush her.     Allergies, and PMH/PSH reviewed in EPIC today.  REVIEW OF SYSTEMS:  4 point ROS including Respiratory, CV, GI and , other than that noted in the HPI,  is negative    Objective:   BP (!) 146/76   Pulse 88   Temp 96.9  F (36.1  C)   Resp 16   Ht 1.6 m (5' 3\")   Wt 42.7 kg (94 lb 1.6 oz)   SpO2 95%   BMI 16.67 kg/m    GENERAL APPEARANCE:  Alert, in no distress  ENT:  Mouth and posterior oropharynx normal, moist mucous membranes  RESP:  lungs clear to auscultation   M/S:   no slings on, no swelling in either arm  PSYCH:  insight and judgement impaired, memory impaired , affect abnormal flat      Assessment/Plan:  (L86.562S) Closed nondisplaced fracture " of surgical neck of left humerus with routine healing, unspecified fracture morphology, subsequent encounter  (primary encounter diagnosis)  (S49.085D) Other closed nondisplaced fracture of proximal end of right humerus with routine healing, subsequent encounter  Comment: Patient's situation is certainly challenging due to NWB on BUE, but advised her that not following her precautions could prolong her healing time. Pain is reasonably controlled. Expect she will need TCU care until she can bear weight on 1 arm at least  Plan: Continue current POC with no changes at this time and adjustments as needed.      Electronically signed by: ANA Peterson CNP        Sincerely,        ANA Peterson CNP

## 2023-08-28 NOTE — PROGRESS NOTES
"[facility] GERIATRICS    Chief Complaint   Patient presents with    RECHECK     HPI:  Terrie Mendez is a 62 year old  (1960), who is being seen today for an episodic care visit at: [facility] (U) [[id number]]. [facility]. Hospital stay 8/14/23 through 8/18/23. Patient with PMH osteoporosis, right femur fracture 1/18/23, alcohol use disorder, depression, PTSD, malnutrition, insomnia, and hypothyroidism. She was seen in the ED 8/10/23 after a fall, found to have a left proximal humerus fracture. Treated non-operatively. She declined TCU and discharged home. On 8/14/23 she presented after another fall, now diagnosed with right humerus fracture. On 8/15/23 she underwent IM nailing of the right fracture.     Today's concern is:   Patient is sitting in the wheelchair, does not have a sling on either arm. She declines offer to help don these. She isn't sure if the hydromorphone does much for her pain. She does not see ortho until 9/7. She sometimes uses her arms to move around in bed or get up. She does still have some intermittent itching. Most of the visit is spent listening to her concerns about staff. She feels that they try to rush her.     Allergies, and PMH/PSH reviewed in EPIC today.  REVIEW OF SYSTEMS:  4 point ROS including Respiratory, CV, GI and , other than that noted in the HPI,  is negative    Objective:   BP (!) 146/76   Pulse 88   Temp 96.9  F (36.1  C)   Resp 16   Ht 1.6 m (5' 3\")   Wt 42.7 kg (94 lb 1.6 oz)   SpO2 95%   BMI 16.67 kg/m    GENERAL APPEARANCE:  Alert, in no distress  ENT:  Mouth and posterior oropharynx normal, moist mucous membranes  RESP:  lungs clear to auscultation   M/S:   no slings on, no swelling in either arm  PSYCH:  insight and judgement impaired, memory impaired , affect abnormal flat      Assessment/Plan:  (B39.099J) Closed nondisplaced fracture of surgical neck of left humerus with routine healing, unspecified fracture morphology, subsequent " encounter  (primary encounter diagnosis)  (H42.511A) Other closed nondisplaced fracture of proximal end of right humerus with routine healing, subsequent encounter  Comment: Patient's situation is certainly challenging due to NWB on BUE, but advised her that not following her precautions could prolong her healing time. Pain is reasonably controlled. Expect she will need TCU care until she can bear weight on 1 arm at least  Plan: Continue current POC with no changes at this time and adjustments as needed.      Electronically signed by: ANA Peterson CNP

## 2023-09-06 ENCOUNTER — TRANSFERRED RECORDS (OUTPATIENT)
Dept: HEALTH INFORMATION MANAGEMENT | Facility: CLINIC | Age: 63
End: 2023-09-06

## 2023-09-06 ENCOUNTER — TRANSITIONAL CARE UNIT VISIT (OUTPATIENT)
Dept: GERIATRICS | Facility: CLINIC | Age: 63
End: 2023-09-06
Payer: COMMERCIAL

## 2023-09-06 VITALS
WEIGHT: 93.4 LBS | RESPIRATION RATE: 20 BRPM | SYSTOLIC BLOOD PRESSURE: 143 MMHG | OXYGEN SATURATION: 95 % | HEART RATE: 107 BPM | TEMPERATURE: 97.3 F | DIASTOLIC BLOOD PRESSURE: 98 MMHG | BODY MASS INDEX: 16.55 KG/M2 | HEIGHT: 63 IN

## 2023-09-06 DIAGNOSIS — S42.294D OTHER CLOSED NONDISPLACED FRACTURE OF PROXIMAL END OF RIGHT HUMERUS WITH ROUTINE HEALING, SUBSEQUENT ENCOUNTER: ICD-10-CM

## 2023-09-06 DIAGNOSIS — S42.201S CLOSED FRACTURE OF PROXIMAL END OF RIGHT HUMERUS, UNSPECIFIED FRACTURE MORPHOLOGY, SEQUELA: ICD-10-CM

## 2023-09-06 DIAGNOSIS — S42.202S CLOSED FRACTURE OF PROXIMAL END OF LEFT HUMERUS, UNSPECIFIED FRACTURE MORPHOLOGY, SEQUELA: ICD-10-CM

## 2023-09-06 DIAGNOSIS — S42.215D: Primary | ICD-10-CM

## 2023-09-06 DIAGNOSIS — F43.12 CHRONIC POST-TRAUMATIC STRESS DISORDER (PTSD): ICD-10-CM

## 2023-09-06 PROCEDURE — 99309 SBSQ NF CARE MODERATE MDM 30: CPT | Performed by: NURSE PRACTITIONER

## 2023-09-06 RX ORDER — HYDROMORPHONE HYDROCHLORIDE 2 MG/1
2-4 TABLET ORAL EVERY 4 HOURS PRN
Qty: 120 TABLET | Refills: 0 | Status: SHIPPED | OUTPATIENT
Start: 2023-09-06

## 2023-09-06 NOTE — PROGRESS NOTES
"[facility] GERIATRICS    Chief Complaint   Patient presents with    RECHECK     HPI:  Terrie Mendez is a 62 year old  (1960), who is being seen today for an episodic care visit at: [facility] (U) [[id number]]. [facility]. Hospital stay 8/14/23 through 8/18/23. Patient with PMH osteoporosis, right femur fracture 1/18/23, alcohol use disorder, depression, PTSD, malnutrition, insomnia, and hypothyroidism. She was seen in the ED 8/10/23 after a fall, found to have a left proximal humerus fracture. Treated non-operatively. She declined TCU and discharged home. On 8/14/23 she presented after another fall, now diagnosed with right humerus fracture. On 8/15/23 she underwent IM nailing of the right fracture.     Today's concern is:   Provider updates patient that an ortho NP will be able to come to the TCU to see her for follow up tomorrow. She is very relieved that she does not have to go out for the appointment. She has been cooperative with therapy, but staff have witnessed her using her arms more than she should. She is again not wearing either sling. Therapy reports that she has a posterior lean when trying to walk, but when they try to correct it, she says they are pushing her forward. SLUMS 23/30. She spends much of the visit talking about her missing laundry    Allergies, and PMH/PSH reviewed in EPIC today.  REVIEW OF SYSTEMS:  4 point ROS including Respiratory, CV, GI and , other than that noted in the HPI,  is negative    Objective:   BP (!) 143/98   Pulse 107   Temp 97.3  F (36.3  C)   Resp 20   Ht 1.6 m (5' 3\")   Wt 42.4 kg (93 lb 6.4 oz)   SpO2 95%   BMI 16.55 kg/m    GENERAL APPEARANCE:  Alert, in no distress, cachectic  EYES:  EOM normal, conjunctiva and lids normal  RESP:  no respiratory distress  PSYCH:  insight and judgement impaired, memory impaired , affect and mood normal      Assessment/Plan:  (F85.108A) Closed traumatic nondisplaced fracture of surgical neck of left " humerus with routine healing  (primary encounter diagnosis)  (S49.589G) Other closed nondisplaced fracture of proximal end of right humerus with routine healing, subsequent encounter  Comment: Patient is not able to progress well with therapy due to NWB BUE, cannot manage ADLs. She has not been following the restriction consistently, so hopefully she has not delayed her healing.   Plan: PT/OT eval and treat, discharge planning per their recommendation. Continue hydromorphone prn for pain, monitor pain severity. F/u ortho as scheduled.    (F43.12) Chronic post-traumatic stress disorder (PTSD)  Comment: No behavioral issues. She has not expressed any interested in leaving AMA as she has at other facilities  Plan: Continue current POC with no changes at this time and adjustments as needed.        Electronically signed by: ANA Peterson CNP

## 2023-09-06 NOTE — PROGRESS NOTES
[facility] GERIATRICS  ACUTE/EPISODIC VISIT    [facility] Medical Record Number:  [id number]  Place of Service where encounter took place:  [facility] [[id number]]    Chief Complaint   Patient presents with    Surgical Followup     S/p R proximal humerus ORIF and L non-op proximal humerus fracture       HPI:    Terrie Mendez is a 62 year old  (1960), who is being seen today for an episodic care visit.  HPI information obtained from: patient report, [facility] chart review, and Care Everywhere [facility] chart review.    Today's Visit:  Terrie Mendez is seen today for post-op check and recheck of bilateral proximal humerus fractures.  L proximal humerus fracture occurred 8/10/23, managed non-operatively with NWB, sling, and pain mgmt and discharged home.  Four days later she readmitted for a fall and was found to have R proximal humerus fracture, underwent ORIF on 8/15 with Dr. Patrick.  Was due for post-op visit on 9/7 but unable to arrange transportation, requests on-site visit.  X-rays were completed prior to today's visit and shared with Dr. Patrick's team (Oniel Haynes PA-C).  Ortho discharge instructions:  NWB bilateral upper extremities, with plan to start WB on R shoulder 2 weeks postop.  ROM R shoulder to 90 degrees, elbow, wrist, digit motion as tolerated.  OK for gentle codman's pendulums of L shoulder, elbow, wrist, digit motion as tolerated.  No DVT prophy.  Sling for comfort on the Right.  Sling to L arm when OOB.   Terrie is seen in her room.  Reports things are overall going well, certainly challenging making progress in ADL independence with bilateral upper extremity fractures.  No discharge date.  Pain management with acetaminophen, hydromorphone.  Does not feel this is adequate.  Per staff, sometimes will use arms to move around in bed or get up and has not been using slings consistently.  Dressing fell off of the R arm, replaced with ABD pad.  No drainage.  Denies swelling.   Denies numbness/tingling.  Ecchymosis to LUE has resolved.        ALLERGIES:  No Known Allergies     MEDICATIONS:  Post Discharge Medication Reconciliation Status: patient was not discharged from an inpatient facility or TCU.     Current Outpatient Medications   Medication Sig Dispense Refill    tiZANidine (ZANAFLEX) 2 MG tablet Take 2 mg by mouth 2 times daily AM and HS.  And once daily PRN pain      acetaminophen (TYLENOL) 500 MG tablet Take 500 mg by mouth 4 times daily      albuterol (PROAIR HFA/PROVENTIL HFA/VENTOLIN HFA) 108 (90 Base) MCG/ACT inhaler Inhale 2 puffs into the lungs every 4 hours as needed      budesonide-formoterol (SYMBICORT) 160-4.5 MCG/ACT Inhaler Inhale 1 puff into the lungs two times daily      DULoxetine (CYMBALTA) 60 MG capsule Take 60 mg by mouth daily      folic acid (FOLVITE) 1 MG tablet Take 1 mg by mouth daily      HYDROmorphone (DILAUDID) 2 MG tablet Take 1-2 tablets (2-4 mg) by mouth every 4 hours as needed for pain 2mg pain 4-6 and 4mg pain 7-10 120 tablet 0    levothyroxine (SYNTHROID/LEVOTHROID) 50 MCG tablet Take 50 mcg by mouth daily      magnesium chloride 535 (64 Mg) MG TBEC CR tablet Take 2 tablets by mouth daily      menthol-zinc oxide (CALMOSEPTINE) 0.44-20.6 % OINT ointment Apply topically daily      polyethylene glycol 3350 POWD Take 17 g by mouth daily      senna (SENOKOT) 8.6 MG tablet Take 8.6 mg by mouth 2 times daily as needed      traZODone (DESYREL) 100 MG tablet Take 100 mg by mouth At Bedtime      Vitamin D3 (CHOLECALCIFEROL) 25 mcg (1000 units) tablet Take 1,000 Units by mouth daily         REVIEW OF SYSTEMS:  4 point ROS neg other than the symptoms noted above in the HPI.    PHYSICAL EXAM:  There were no vitals taken for this visit.  General:  Alert, oriented x3, no apparent distress. Thin.  On physical exam of bilateral upper extremities:  Skin: LUE- intact.  RUE- six small incisions with sutures, no drainage or erythema.  Swelling: LUE- slight, localized  anteriorly.  RUE- none  Tenderness to palpation- none bilaterally  ROM: LUE- deferred d/t injury.  RUE- 90 degrees forward flexion  Radial pulses are intact and equal bilaterally  Sensation to light touch intact and equal bilaterally.     X-rays reviewed by me personally.  R humerus (AP/lat) demonstrates intact ORIF fixation of R proximal humerus fracture with nail and screws.  Fracture visible but secured on either end with screws.  No evidence of hardware loosening or failure.    L humerus (AP/lat) demonstrates non-displaced proximal humerus fracture, impacted but stable compared to previous images.    ASSESSMENT / PLAN:  (S42.202S) Closed fracture of proximal end of left humerus, unspecified fracture morphology, sequela  (primary encounter diagnosis)  Stable.  This arm is the more painful arm, likely related to poorly fitted sling causing arm to hang down and exert strain on the fracture site. Switched her to a smaller sling (she was previously using for the R arm) and adjusted to hold arm more securely.  Reported it felt much better in new position.  Discussed continuing to keep weight off the arm, no pushing off with the arm, to allow healing.  Pain worse in AM and at HS.  X-rays and case reviewed by Isaiah Haynes PA-C at [facility].  Plan:   -Continue NWB to LUE, sling when out of bed  -OK for ongoing ROM of elbow, wrist and digits.  No ROM of shoulder.  -Discontinue hydroxyzine, start tizanidine 2mg in AM and HS for additional pain management.  PRN tizanidine also ordered q daily.  -Consider adding ibuprofen if pain continues to be an issue  -Recheck in 4 weeks with new x-rays (if still at TCU, on-site visit with writer.  If discharged, should see [facility] for recheck)    (S42.201S) Closed fracture of proximal end of right humerus, unspecified fracture morphology, sequela  (Z98.890,  Z87.81) S/P ORIF (open reduction internal fixation) fracture  Stable.  Incisions free of s/s infection,  healing well.  No swelling, no erythema or ecchymosis.  X-rays show intact fixation.  X-rays and case reviewed with Arturo Haynes PA-C at [facility].  Plan:   -Begin WBAT to RUE, no restrictions  -Sling PRN for comfort  -OK to leave incisions open to air and get wet  -Recheck in 4 weeks with new x-rays (if still at TCU, on-site visit with writer.  If discharged, should see [facility] for recheck)      --------------------------------------------------------------------  New Orders:  -Begin WBAT to R upper extremity  -Continue NWB to LUE  -Tizanidine 2mg AM and HS and once daily PRN pain  -Follow-up with ortho in 4 weeks    Electronically signed by  ANA Llamas CNP

## 2023-09-06 NOTE — LETTER
"    9/6/2023        RE: Terrie Mendez  [address]   [address]  [place] [address]        [facility] GERIATRICS    Chief Complaint   Patient presents with     RECHECK     HPI:  Terrie Mendez is a 62 year old  (1960), who is being seen today for an episodic care visit at: [facility] (U) [[id number]]. [facility]. Hospital stay 8/14/23 through 8/18/23. Patient with PMH osteoporosis, right femur fracture 1/18/23, alcohol use disorder, depression, PTSD, malnutrition, insomnia, and hypothyroidism. She was seen in the ED 8/10/23 after a fall, found to have a left proximal humerus fracture. Treated non-operatively. She declined TCU and discharged home. On 8/14/23 she presented after another fall, now diagnosed with right humerus fracture. On 8/15/23 she underwent IM nailing of the right fracture.     Today's concern is:   Provider updates patient that an ortho NP will be able to come to the TCU to see her for follow up tomorrow. She is very relieved that she does not have to go out for the appointment. She has been cooperative with therapy, but staff have witnessed her using her arms more than she should. She is again not wearing either sling. Therapy reports that she has a posterior lean when trying to walk, but when they try to correct it, she says they are pushing her forward. SLUMS 23/30. She spends much of the visit talking about her missing laundry    Allergies, and PMH/PSH reviewed in EPIC today.  REVIEW OF SYSTEMS:  4 point ROS including Respiratory, CV, GI and , other than that noted in the HPI,  is negative    Objective:   BP (!) 143/98   Pulse 107   Temp 97.3  F (36.3  C)   Resp 20   Ht 1.6 m (5' 3\")   Wt 42.4 kg (93 lb 6.4 oz)   SpO2 95%   BMI 16.55 kg/m    GENERAL APPEARANCE:  Alert, in no distress, cachectic  EYES:  EOM normal, conjunctiva and lids normal  RESP:  no respiratory distress  PSYCH:  insight and judgement impaired, memory impaired , affect and mood " normal      Assessment/Plan:  (S42.215D) Closed traumatic nondisplaced fracture of surgical neck of left humerus with routine healing  (primary encounter diagnosis)  (S42.294D) Other closed nondisplaced fracture of proximal end of right humerus with routine healing, subsequent encounter  Comment: Patient is not able to progress well with therapy due to NWB BUE, cannot manage ADLs. She has not been following the restriction consistently, so hopefully she has not delayed her healing.   Plan: PT/OT eval and treat, discharge planning per their recommendation. Continue hydromorphone prn for pain, monitor pain severity. F/u ortho as scheduled.    (F43.12) Chronic post-traumatic stress disorder (PTSD)  Comment: No behavioral issues. She has not expressed any interested in leaving AMA as she has at other facilities  Plan: Continue current POC with no changes at this time and adjustments as needed.        Electronically signed by: ANA Peterson CNP           Sincerely,        ANA Peterson CNP

## 2023-09-07 ENCOUNTER — TRANSITIONAL CARE UNIT VISIT (OUTPATIENT)
Dept: GERIATRICS | Facility: CLINIC | Age: 63
End: 2023-09-07
Payer: COMMERCIAL

## 2023-09-07 DIAGNOSIS — Z98.890 S/P ORIF (OPEN REDUCTION INTERNAL FIXATION) FRACTURE: ICD-10-CM

## 2023-09-07 DIAGNOSIS — S42.201S CLOSED FRACTURE OF PROXIMAL END OF RIGHT HUMERUS, UNSPECIFIED FRACTURE MORPHOLOGY, SEQUELA: ICD-10-CM

## 2023-09-07 DIAGNOSIS — S42.202S CLOSED FRACTURE OF PROXIMAL END OF LEFT HUMERUS, UNSPECIFIED FRACTURE MORPHOLOGY, SEQUELA: Primary | ICD-10-CM

## 2023-09-07 DIAGNOSIS — Z87.81 S/P ORIF (OPEN REDUCTION INTERNAL FIXATION) FRACTURE: ICD-10-CM

## 2023-09-07 PROCEDURE — 99308 SBSQ NF CARE LOW MDM 20: CPT | Performed by: NURSE PRACTITIONER

## 2023-09-07 RX ORDER — TIZANIDINE 2 MG/1
2 TABLET ORAL 2 TIMES DAILY
COMMUNITY

## 2023-09-07 NOTE — LETTER
9/7/2023        RE: Terrie Mendez  [address]   [address]  [place] [address]        [facility] GERIATRICS  ACUTE/EPISODIC VISIT    [facility] Medical Record Number:  [id number]  Place of Service where encounter took place:  [facility] ([facility]) [[id number]]    Chief Complaint   Patient presents with     Surgical Followup     S/p R proximal humerus ORIF and L non-op proximal humerus fracture       HPI:    Terrie Mendez is a 62 year old  (1960), who is being seen today for an episodic care visit.  HPI information obtained from: patient report, [facility] chart review, and [facility].    Today's Visit:  Terrie Mendez is seen today for post-op check and recheck of bilateral proximal humerus fractures.  L proximal humerus fracture occurred 8/10/23, managed non-operatively with NWB, sling, and pain mgmt and discharged home.  Four days later she readmitted for a fall and was found to have R proximal humerus fracture, underwent ORIF on 8/15 with Dr. Patrick.  Was due for post-op visit on 9/7 but unable to arrange transportation, requests on-site visit.  X-rays were completed prior to today's visit and shared with Dr. Patrick's team (Oniel Haynes PA-C).  Ortho discharge instructions:  NWB bilateral upper extremities, with plan to start WB on R shoulder 2 weeks postop.  ROM R shoulder to 90 degrees, elbow, wrist, digit motion as tolerated.  OK for gentle codman's pendulums of L shoulder, elbow, wrist, digit motion as tolerated.  No DVT prophy.  Sling for comfort on the Right.  Sling to L arm when OOB.   Terrie is seen in her room.  Reports things are overall going well, certainly challenging making progress in ADL independence with bilateral upper extremity fractures.  No discharge date.  Pain management with acetaminophen, hydromorphone.  Does not feel this is adequate.  Per staff, sometimes will use arms to move around in bed or get up and has not been using slings  consistently.  Dressing fell off of the R arm, replaced with ABD pad.  No drainage.  Denies swelling.  Denies numbness/tingling.  Ecchymosis to LUE has resolved.        ALLERGIES:  No Known Allergies     MEDICATIONS:  Post Discharge Medication Reconciliation Status: patient was not discharged from an inpatient facility or TCU.     Current Outpatient Medications   Medication Sig Dispense Refill     tiZANidine (ZANAFLEX) 2 MG tablet Take 2 mg by mouth 2 times daily AM and HS.  And once daily PRN pain       acetaminophen (TYLENOL) 500 MG tablet Take 500 mg by mouth 4 times daily       albuterol (PROAIR HFA/PROVENTIL HFA/VENTOLIN HFA) 108 (90 Base) MCG/ACT inhaler Inhale 2 puffs into the lungs every 4 hours as needed       budesonide-formoterol (SYMBICORT) 160-4.5 MCG/ACT Inhaler Inhale 1 puff into the lungs two times daily       DULoxetine (CYMBALTA) 60 MG capsule Take 60 mg by mouth daily       folic acid (FOLVITE) 1 MG tablet Take 1 mg by mouth daily       HYDROmorphone (DILAUDID) 2 MG tablet Take 1-2 tablets (2-4 mg) by mouth every 4 hours as needed for pain 2mg pain 4-6 and 4mg pain 7-10 120 tablet 0     levothyroxine (SYNTHROID/LEVOTHROID) 50 MCG tablet Take 50 mcg by mouth daily       magnesium chloride 535 (64 Mg) MG TBEC CR tablet Take 2 tablets by mouth daily       menthol-zinc oxide (CALMOSEPTINE) 0.44-20.6 % OINT ointment Apply topically daily       polyethylene glycol 3350 POWD Take 17 g by mouth daily       senna (SENOKOT) 8.6 MG tablet Take 8.6 mg by mouth 2 times daily as needed       traZODone (DESYREL) 100 MG tablet Take 100 mg by mouth At Bedtime       Vitamin D3 (CHOLECALCIFEROL) 25 mcg (1000 units) tablet Take 1,000 Units by mouth daily         REVIEW OF SYSTEMS:  4 point ROS neg other than the symptoms noted above in the HPI.    PHYSICAL EXAM:  There were no vitals taken for this visit.  General:  Alert, oriented x3, no apparent distress. Thin.  On physical exam of bilateral upper  extremities:  Skin: LUE- intact.  RUE- six small incisions with sutures, no drainage or erythema.  Swelling: LUE- slight, localized anteriorly.  RUE- none  Tenderness to palpation- none bilaterally  ROM: LUE- deferred d/t injury.  RUE- 90 degrees forward flexion  Radial pulses are intact and equal bilaterally  Sensation to light touch intact and equal bilaterally.     X-rays reviewed by me personally.  R humerus (AP/lat) demonstrates intact ORIF fixation of R proximal humerus fracture with nail and screws.  Fracture visible but secured on either end with screws.  No evidence of hardware loosening or failure.    L humerus (AP/lat) demonstrates non-displaced proximal humerus fracture, impacted but stable compared to previous images.    ASSESSMENT / PLAN:  (S42.202S) Closed fracture of proximal end of left humerus, unspecified fracture morphology, sequela  (primary encounter diagnosis)  Stable.  This arm is the more painful arm, likely related to poorly fitted sling causing arm to hang down and exert strain on the fracture site. Switched her to a smaller sling (she was previously using for the R arm) and adjusted to hold arm more securely.  Reported it felt much better in new position.  Discussed continuing to keep weight off the arm, no pushing off with the arm, to allow healing.  Pain worse in AM and at HS.  X-rays and case reviewed by Isaiah Haynes PA-C at [facility].  Plan:   -Continue NWB to LUE, sling when out of bed  -OK for ongoing ROM of elbow, wrist and digits.  No ROM of shoulder.  -Discontinue hydroxyzine, start tizanidine 2mg in AM and HS for additional pain management.  PRN tizanidine also ordered q daily.  -Consider adding ibuprofen if pain continues to be an issue  -Recheck in 4 weeks with new x-rays (if still at TCU, on-site visit with writer.  If discharged, should [facility] for recheck)    (S42.201S) Closed fracture of proximal end of right humerus, unspecified fracture  morphology, sequela  (Z98.890,  Z87.81) S/P ORIF (open reduction internal fixation) fracture  Stable.  Incisions free of s/s infection, healing well.  No swelling, no erythema or ecchymosis.  X-rays show intact fixation.  X-rays and case reviewed with Arturo Haynes PA-C at [facility].  Plan:   -Begin WBAT to RUE, no restrictions  -Sling PRN for comfort  -OK to leave incisions open to air and get wet  -Recheck in 4 weeks with new x-rays (if still at [facility], on-site visit with writer.  If discharged, should see [facility] for recheck)      --------------------------------------------------------------------  New Orders:  -Begin WBAT to R upper extremity  -Continue NWB to LUE  -Tizanidine 2mg AM and HS and once daily PRN pain  -Follow-up with ortho in 4 weeks    Electronically signed by  ANA Llamas CNP          Sincerely,        ANA Llamas CNP

## 2023-09-07 NOTE — LETTER
9/7/2023        RE: Terrie Mendez  [address]   [address]  [place] [address]        [facility] GERIATRICS  ACUTE/EPISODIC VISIT    [facility] Medical Record Number:  [id number]  Place of Service where encounter took place:  [facility] ([facility]) [[id number]]    Chief Complaint   Patient presents with     Surgical Followup     S/p R proximal humerus ORIF and L non-op proximal humerus fracture       HPI:    Terrie Mendez is a 62 year old  (1960), who is being seen today for an episodic care visit.  HPI information obtained from: patient report, [facility] chart review, and [facility].    Today's Visit:  Terrie Mendez is seen today for post-op check and recheck of bilateral proximal humerus fractures.  L proximal humerus fracture occurred 8/10/23, managed non-operatively with NWB, sling, and pain mgmt and discharged home.  Four days later she readmitted for a fall and was found to have R proximal humerus fracture, underwent ORIF on 8/15 with Dr. Patrick.  Was due for post-op visit on 9/7 but unable to arrange transportation, requests on-site visit.  X-rays were completed prior to today's visit and shared with Dr. Patrick's team (Oniel Haynes PA-C).  Ortho discharge instructions:  NWB bilateral upper extremities, with plan to start WB on R shoulder 2 weeks postop.  ROM R shoulder to 90 degrees, elbow, wrist, digit motion as tolerated.  OK for gentle codman's pendulums of L shoulder, elbow, wrist, digit motion as tolerated.  No DVT prophy.  Sling for comfort on the Right.  Sling to L arm when OOB.   Terrie is seen in her room.  Reports things are overall going well, certainly challenging making progress in ADL independence with bilateral upper extremity fractures.  No discharge date.  Pain management with acetaminophen, hydromorphone.  Does not feel this is adequate.  Per staff, sometimes will use arms to move around in bed or get up and has not been using slings  consistently.  Dressing fell off of the R arm, replaced with ABD pad.  No drainage.  Denies swelling.  Denies numbness/tingling.  Ecchymosis to LUE has resolved.        ALLERGIES:  No Known Allergies     MEDICATIONS:  Post Discharge Medication Reconciliation Status: patient was not discharged from an inpatient facility or TCU.     Current Outpatient Medications   Medication Sig Dispense Refill     tiZANidine (ZANAFLEX) 2 MG tablet Take 2 mg by mouth 2 times daily AM and HS.  And once daily PRN pain       acetaminophen (TYLENOL) 500 MG tablet Take 500 mg by mouth 4 times daily       albuterol (PROAIR HFA/PROVENTIL HFA/VENTOLIN HFA) 108 (90 Base) MCG/ACT inhaler Inhale 2 puffs into the lungs every 4 hours as needed       budesonide-formoterol (SYMBICORT) 160-4.5 MCG/ACT Inhaler Inhale 1 puff into the lungs two times daily       DULoxetine (CYMBALTA) 60 MG capsule Take 60 mg by mouth daily       folic acid (FOLVITE) 1 MG tablet Take 1 mg by mouth daily       HYDROmorphone (DILAUDID) 2 MG tablet Take 1-2 tablets (2-4 mg) by mouth every 4 hours as needed for pain 2mg pain 4-6 and 4mg pain 7-10 120 tablet 0     levothyroxine (SYNTHROID/LEVOTHROID) 50 MCG tablet Take 50 mcg by mouth daily       magnesium chloride 535 (64 Mg) MG TBEC CR tablet Take 2 tablets by mouth daily       menthol-zinc oxide (CALMOSEPTINE) 0.44-20.6 % OINT ointment Apply topically daily       polyethylene glycol 3350 POWD Take 17 g by mouth daily       senna (SENOKOT) 8.6 MG tablet Take 8.6 mg by mouth 2 times daily as needed       traZODone (DESYREL) 100 MG tablet Take 100 mg by mouth At Bedtime       Vitamin D3 (CHOLECALCIFEROL) 25 mcg (1000 units) tablet Take 1,000 Units by mouth daily         REVIEW OF SYSTEMS:  4 point ROS neg other than the symptoms noted above in the HPI.    PHYSICAL EXAM:  There were no vitals taken for this visit.  General:  Alert, oriented x3, no apparent distress. Thin.  On physical exam of bilateral upper  extremities:  Skin: LUE- intact.  RUE- six small incisions with sutures, no drainage or erythema.  Swelling: LUE- slight, localized anteriorly.  RUE- none  Tenderness to palpation- none bilaterally  ROM: LUE- deferred d/t injury.  RUE- 90 degrees forward flexion  Radial pulses are intact and equal bilaterally  Sensation to light touch intact and equal bilaterally.     X-rays reviewed by me personally.  R humerus (AP/lat) demonstrates intact ORIF fixation of R proximal humerus fracture with nail and screws.  Fracture visible but secured on either end with screws.  No evidence of hardware loosening or failure.    L humerus (AP/lat) demonstrates non-displaced proximal humerus fracture, impacted but stable compared to previous images.    ASSESSMENT / PLAN:  (S42.202S) Closed fracture of proximal end of left humerus, unspecified fracture morphology, sequela  (primary encounter diagnosis)  Stable.  This arm is the more painful arm, likely related to poorly fitted sling causing arm to hang down and exert strain on the fracture site. Switched her to a smaller sling (she was previously using for the R arm) and adjusted to hold arm more securely.  Reported it felt much better in new position.  Discussed continuing to keep weight off the arm, no pushing off with the arm, to allow healing.  Pain worse in AM and at HS.  X-rays and case reviewed by Isaiah Haynes PA-C at [facility].  Plan:   -Continue NWB to LUE, sling when out of bed  -OK for ongoing ROM of elbow, wrist and digits.  No ROM of shoulder.  -Discontinue hydroxyzine, start tizanidine 2mg in AM and HS for additional pain management.  PRN tizanidine also ordered q daily.  -Consider adding ibuprofen if pain continues to be an issue  -Recheck in 4 weeks with new x-rays (if still at TCU, on-site visit with writer.  If discharged, [facility] for recheck)    (S42.201S) Closed fracture of proximal end of right humerus, unspecified fracture  morphology, sequela  (Z98.890,  Z87.81) S/P ORIF (open reduction internal fixation) fracture  Stable.  Incisions free of s/s infection, healing well.  No swelling, no erythema or ecchymosis.  X-rays show intact fixation.  X-rays and case reviewed with Arturo Haynes PA-C at [facility].  Plan:   -Begin WBAT to RUE, no restrictions  -Sling PRN for comfort  -OK to leave incisions open to air and get wet  -Recheck in 4 weeks with new x-rays (if still at [facility], on-site visit with writer.  If discharged, should see [facility] for recheck)      --------------------------------------------------------------------  New Orders:  -Begin WBAT to R upper extremity  -Continue NWB to LUE  -Tizanidine 2mg AM and HS and once daily PRN pain  -Follow-up with ortho in 4 weeks    Electronically signed by  ANA Llamas CNP          Sincerely,        ANA Llamas CNP

## 2023-09-13 ENCOUNTER — TRANSITIONAL CARE UNIT VISIT (OUTPATIENT)
Dept: GERIATRICS | Facility: CLINIC | Age: 63
End: 2023-09-13
Payer: COMMERCIAL

## 2023-09-13 VITALS
HEIGHT: 63 IN | HEART RATE: 76 BPM | WEIGHT: 99 LBS | BODY MASS INDEX: 17.54 KG/M2 | OXYGEN SATURATION: 95 % | RESPIRATION RATE: 18 BRPM | TEMPERATURE: 96.6 F | SYSTOLIC BLOOD PRESSURE: 132 MMHG | DIASTOLIC BLOOD PRESSURE: 72 MMHG

## 2023-09-13 DIAGNOSIS — S42.202S CLOSED FRACTURE OF PROXIMAL END OF LEFT HUMERUS, UNSPECIFIED FRACTURE MORPHOLOGY, SEQUELA: Primary | ICD-10-CM

## 2023-09-13 DIAGNOSIS — F43.12 CHRONIC POST-TRAUMATIC STRESS DISORDER (PTSD): ICD-10-CM

## 2023-09-13 DIAGNOSIS — S42.294D OTHER CLOSED NONDISPLACED FRACTURE OF PROXIMAL END OF RIGHT HUMERUS WITH ROUTINE HEALING, SUBSEQUENT ENCOUNTER: ICD-10-CM

## 2023-09-13 PROCEDURE — 99308 SBSQ NF CARE LOW MDM 20: CPT | Performed by: NURSE PRACTITIONER

## 2023-09-13 NOTE — LETTER
"    9/13/2023        RE: Terrie Mendez  [address]   [address]  [place] [address]        [facility] GERIATRICS    Chief Complaint   Patient presents with     RECHECK     HPI:  Terrie Mendez is a 62 year old  (1960), who is being seen today for an episodic care visit at: [facility] ([facility]) [[id number]]. . Hospital stay 8/14/23 through 8/18/23. Patient with PMH osteoporosis, right femur fracture 1/18/23, alcohol use disorder, depression, PTSD, malnutrition, insomnia, and hypothyroidism. She was seen in the ED 8/10/23 after a fall, found to have a left proximal humerus fracture. Treated non-operatively. She declined TCU and discharged home. On 8/14/23 she presented after another fall, now diagnosed with right humerus fracture. On 8/15/23 she underwent IM nailing of the right fracture.     Today's concern is:   Patient was seen by ortho last week and upgraded to WBAT on her RUE. She has been using a hemiwalker with therapy, walking 50 feet. They are working on stairs using a railing on the right side. As usual, she says that she has pain and the hydromorphone does not help much.     Allergies, and PMH/PSH reviewed in [facility] today.  REVIEW OF SYSTEMS:  4 point ROS including Respiratory, CV, GI and , other than that noted in the HPI,  is negative    Objective:   /72   Pulse 76   Temp (!) 96.6  F (35.9  C)   Resp 18   Ht 1.6 m (5' 3\")   Wt 44.9 kg (99 lb)   SpO2 95%   BMI 17.54 kg/m    GENERAL APPEARANCE:  Alert, in no distress, thin  ENT:  Mouth and posterior oropharynx normal, moist mucous membranes  EYES:  EOM normal, conjunctiva and lids normal  RESP:  no respiratory distress  PSYCH:  memory impaired , affect and mood normal    Recent labs in [facility] reviewed by me today.     Assessment/Plan:  (S42.202S) Closed fracture of proximal end of left humerus, unspecified fracture morphology, sequela  (primary encounter diagnosis)  (S42.459D) Other closed nondisplaced " fracture of proximal end of right humerus with routine healing, subsequent encounter  Comment: Patient is healing despite her using her arms frequently when she is in her room. Hopefully she will be able to make progress now that she can bear weight on the RUE. ADLs have been the biggest barrier for her. Pain is reasonably controlled. Would not recommend increase in opioids.   Plan: PT/OT eval and treat, discharge planning per their recommendation. Continue hydromorphone prn for pain, monitor pain severity. Monitor incision. F/u ortho as scheduled.    (F43.12) Chronic post-traumatic stress disorder (PTSD)  Comment: Chronic condition being managed with medications.  Plan: Continue current POC with no changes at this time and adjustments as needed.      Electronically signed by: ANA Peterson CNP           Sincerely,        ANA Peterson CNP

## 2023-09-13 NOTE — PROGRESS NOTES
"[facility] GERIATRICS    Chief Complaint   Patient presents with    RECHECK     HPI:  Terrie Mendez is a 62 year old  (1960), who is being seen today for an episodic care visit at: [facility] ([facility]) [[id number]]. [facility]. Hospital stay 8/14/23 through 8/18/23. Patient with PMH osteoporosis, right femur fracture 1/18/23, alcohol use disorder, depression, PTSD, malnutrition, insomnia, and hypothyroidism. She was seen in the ED 8/10/23 after a fall, found to have a left proximal humerus fracture. Treated non-operatively. She declined TCU and discharged home. On 8/14/23 she presented after another fall, now diagnosed with right humerus fracture. On 8/15/23 she underwent IM nailing of the right fracture.     Today's concern is:   Patient was seen by ortho last week and upgraded to WBAT on her RUE. She has been using a hemiwalker with therapy, walking 50 feet. They are working on stairs using a railing on the right side. As usual, she says that she has pain and the hydromorphone does not help much.     Allergies, and PMH/PSH reviewed in [facility] today.  REVIEW OF SYSTEMS:  4 point ROS including Respiratory, CV, GI and , other than that noted in the HPI,  is negative    Objective:   /72   Pulse 76   Temp (!) 96.6  F (35.9  C)   Resp 18   Ht 1.6 m (5' 3\")   Wt 44.9 kg (99 lb)   SpO2 95%   BMI 17.54 kg/m    GENERAL APPEARANCE:  Alert, in no distress, thin  ENT:  Mouth and posterior oropharynx normal, moist mucous membranes  EYES:  EOM normal, conjunctiva and lids normal  RESP:  no respiratory distress  PSYCH:  memory impaired , affect and mood normal    Recent labs in [facility] reviewed by me today.     Assessment/Plan:  (S42.202S) Closed fracture of proximal end of left humerus, unspecified fracture morphology, sequela  (primary encounter diagnosis)  (S42.294D) Other closed nondisplaced fracture of proximal end of right humerus with routine healing, subsequent encounter  Comment: Patient is " healing despite her using her arms frequently when she is in her room. Hopefully she will be able to make progress now that she can bear weight on the RUE. ADLs have been the biggest barrier for her. Pain is reasonably controlled. Would not recommend increase in opioids.   Plan: PT/OT eval and treat, discharge planning per their recommendation. Continue hydromorphone prn for pain, monitor pain severity. Monitor incision. F/u ortho as scheduled.    (F43.12) Chronic post-traumatic stress disorder (PTSD)  Comment: Chronic condition being managed with medications.  Plan: Continue current POC with no changes at this time and adjustments as needed.      Electronically signed by: ANA Peterson CNP

## 2023-09-20 ENCOUNTER — TRANSITIONAL CARE UNIT VISIT (OUTPATIENT)
Dept: GERIATRICS | Facility: CLINIC | Age: 63
End: 2023-09-20
Payer: COMMERCIAL

## 2023-09-20 VITALS
DIASTOLIC BLOOD PRESSURE: 66 MMHG | WEIGHT: 100.2 LBS | HEIGHT: 63 IN | SYSTOLIC BLOOD PRESSURE: 120 MMHG | HEART RATE: 70 BPM | BODY MASS INDEX: 17.75 KG/M2 | TEMPERATURE: 97 F | RESPIRATION RATE: 18 BRPM | OXYGEN SATURATION: 96 %

## 2023-09-20 DIAGNOSIS — M62.81 GENERALIZED MUSCLE WEAKNESS: ICD-10-CM

## 2023-09-20 DIAGNOSIS — S42.294D OTHER CLOSED NONDISPLACED FRACTURE OF PROXIMAL END OF RIGHT HUMERUS WITH ROUTINE HEALING, SUBSEQUENT ENCOUNTER: ICD-10-CM

## 2023-09-20 DIAGNOSIS — S42.202S CLOSED FRACTURE OF PROXIMAL END OF LEFT HUMERUS, UNSPECIFIED FRACTURE MORPHOLOGY, SEQUELA: Primary | ICD-10-CM
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
## 2023-09-20 PROCEDURE — 99308 SBSQ NF CARE LOW MDM 20: CPT

## 2023-09-20 NOTE — LETTER
"    9/20/2023        RE: Terrie Mendez  [address]   [address]  [place] [address]        [facility] GERIATRICS    Chief Complaint   Patient presents with     RECHECK     HPI:  Terrie Mendez is a 62 year old  (1960), who is being seen today for an episodic care visit at: [facility] (U) [[id number]]. Today's concern is: TCU Follow Up     Patient hospitalized at [facility] from 8/14-8/18/23 with a left proximal humerus fracture following a fall.  Of note, had a right forearm fracture in 1/2023. PMHx includes osteoporosis, EtOH use disorder, depression, PTSD, and hypothyroidism.    Today: Patient being seen for TCU follow-up visit.  No changes in condition since she was seen last.  LUE in a sling.  Pain controlled with current regimen.  Ambulating fine per patient.  Hemodynamically stable.  Tolerating diet.  Slept well overnight.  Wondering about her next ortho appointment.  Staff has no acute concerns.    Allergies, and PMH/PSH reviewed in [facility] today.  REVIEW OF SYSTEMS:  4 point ROS including Respiratory, CV, GI and , other than that noted in the HPI,  is negative    Objective:   /66   Pulse 70   Temp 97  F (36.1  C)   Resp 18   Ht 1.6 m (5' 3\")   Wt 45.5 kg (100 lb 3.2 oz)   SpO2 96%   BMI 17.75 kg/m      Exam:  GENERAL APPEARANCE: No acute distress, alert and awake  RESPIRATORY: Clear to auscultation, even and unlabored, symmetrical chest wall expansion  CARDIOVASCULAR: RRR, no peripheral edema, S1/S2 normal  GASTROINTESTINAL: Soft, nontender, nondistended, bowel sounds present x4 quadrants  MUSCULOSKELETAL: LUE in sling   NEUROLOGICAL: Alert and oriented  PSYCHOLOGICAL: Calm    Recent labs in [facility] reviewed by me today.     Assessment/Plan:  Left humerus fracture  Hx of right humerus fracture in 1/2023  Fall, sequela  Generalized weakness  -Progressing well, pain controlled, working with therapy and tolerating  -Continue current pain regimen, continue therapies, " Ortho follow-up as scheduled, monitor pain, fall precautions      MED REC REQUIRED  Post Medication Reconciliation Status:  Discharge medications reconciled, continue medications without change         Electronically signed by:   Aguilar Zarate DNP,ANA,KELLYP-BC.               The above note was completed in part using Dragon voice recognition software. Although reviewed after completion, some word and grammatical errors may occur. Please contact the author of this note with any questions.            Sincerely,        ANA Sandoval CNP

## 2023-09-20 NOTE — PROGRESS NOTES
"[facility] GERIATRICS    Chief Complaint   Patient presents with    RECHECK     HPI:  Terrie Mendez is a 62 year old  (1960), who is being seen today for an episodic care visit at: [facility] ([facility]) [[id number]]. Today's concern is: TCU Follow Up     Patient hospitalized at [facility] from 8/14-8/18/23 with a left proximal humerus fracture following a fall.  Of note, had a right forearm fracture in 1/2023. PMHx includes osteoporosis, EtOH use disorder, depression, PTSD, and hypothyroidism.    Today: Patient being seen for TCU follow-up visit.  No changes in condition since she was seen last.  LUE in a sling.  Pain controlled with current regimen.  Ambulating fine per patient.  Hemodynamically stable.  Tolerating diet.  Slept well overnight.  Wondering about her next ortho appointment.  Staff has no acute concerns.    Allergies, and PMH/PSH reviewed in [facility] today.  REVIEW OF SYSTEMS:  4 point ROS including Respiratory, CV, GI and , other than that noted in the HPI,  is negative    Objective:   /66   Pulse 70   Temp 97  F (36.1  C)   Resp 18   Ht 1.6 m (5' 3\")   Wt 45.5 kg (100 lb 3.2 oz)   SpO2 96%   BMI 17.75 kg/m      Exam:  GENERAL APPEARANCE: No acute distress, alert and awake  RESPIRATORY: Clear to auscultation, even and unlabored, symmetrical chest wall expansion  CARDIOVASCULAR: RRR, no peripheral edema, S1/S2 normal  GASTROINTESTINAL: Soft, nontender, nondistended, bowel sounds present x4 quadrants  MUSCULOSKELETAL: LUE in sling   NEUROLOGICAL: Alert and oriented  PSYCHOLOGICAL: Calm    Recent labs in EPIC reviewed by me today.     Assessment/Plan:  Left humerus fracture  Hx of right humerus fracture in 1/2023  Fall, sequela  Generalized weakness  -Progressing well, pain controlled, working with therapy and tolerating  -Continue current pain regimen, continue therapies, Ortho follow-up as scheduled, monitor pain, fall precautions      MED REC REQUIRED  Post Medication " Reconciliation Status:  Discharge medications reconciled, continue medications without change         Electronically signed by:   Aguilar Zarate,ADAMA,APRN,AGNP-BC.               The above note was completed in part using Dragon voice recognition software. Although reviewed after completion, some word and grammatical errors may occur. Please contact the author of this note with any questions.

## 2023-09-27 ENCOUNTER — DISCHARGE SUMMARY NURSING HOME (OUTPATIENT)
Dept: GERIATRICS | Facility: CLINIC | Age: 63
End: 2023-09-27
Payer: COMMERCIAL

## 2023-09-27 VITALS
SYSTOLIC BLOOD PRESSURE: 126 MMHG | BODY MASS INDEX: 17.75 KG/M2 | HEART RATE: 96 BPM | RESPIRATION RATE: 16 BRPM | TEMPERATURE: 96.8 F | DIASTOLIC BLOOD PRESSURE: 85 MMHG | WEIGHT: 100.2 LBS | HEIGHT: 63 IN | OXYGEN SATURATION: 96 %

## 2023-09-27 DIAGNOSIS — S42.202S CLOSED FRACTURE OF PROXIMAL END OF LEFT HUMERUS, UNSPECIFIED FRACTURE MORPHOLOGY, SEQUELA: Primary | ICD-10-CM

## 2023-09-27 DIAGNOSIS — F43.12 CHRONIC POST-TRAUMATIC STRESS DISORDER (PTSD): ICD-10-CM

## 2023-09-27 DIAGNOSIS — S42.201S CLOSED FRACTURE OF PROXIMAL END OF RIGHT HUMERUS, UNSPECIFIED FRACTURE MORPHOLOGY, SEQUELA: ICD-10-CM

## 2023-09-27 PROCEDURE — 99315 NF DSCHRG MGMT 30 MIN/LESS: CPT | Performed by: NURSE PRACTITIONER

## 2023-09-27 RX ORDER — IBUPROFEN 200 MG
400 TABLET ORAL EVERY 6 HOURS PRN
COMMUNITY

## 2023-09-27 NOTE — PROGRESS NOTES
[facility] GERIATRICS DISCHARGE SUMMARY  PATIENT'S NAME: Terrie Mendez  YOB: 1960  MEDICAL RECORD NUMBER:  [id number]  Place of Service where encounter took place:  [facility] ([facility]) [[id number]]    PRIMARY CARE PROVIDER AND CLINIC RESPONSIBLE AFTER TRANSFER:   Maria Da Silva NP   Non-FMG Provider     Transferring providers: ANA Peterson CNP, Maryjo Bernabe MD  Recent Hospitalization/ED:  Hospital  [facility] stay 8/14/23 to 8/18/23.  Date of SNF Admission: August 18, 2023  Date of SNF (anticipated) Discharge: September 30, 2023  Discharged to: previous independent home  Cognitive Scores: SLUMS: 23/30  Physical Function: Ambulating 150 ft with hemiwalker  DME: hemiwalker    CODE STATUS/ADVANCE DIRECTIVES DISCUSSION:  Full Code   ALLERGIES: Patient has no known allergies.    NURSING FACILITY COURSE   Medication Changes/Rationale:   Ibuprofen added today    Summary of nursing facility stay:   Terrie Mendez  is a 62 year old  (1960), admitted to the above facility from  [facility]. Hospital stay 8/14/23 through 8/18/23. Patient with PMH osteoporosis, right femur fracture 1/18/23, alcohol use disorder, depression, PTSD, malnutrition, insomnia, and hypothyroidism. She was seen in the ED 8/10/23 after a fall, found to have a left proximal humerus fracture. Treated non-operatively. She declined TCU and discharged home. On 8/14/23 she presented after another fall, now diagnosed with right humerus fracture. On 8/15/23 she underwent IM nailing of the right fracture.     Closed fracture of proximal end of left humerus, unspecified fracture morphology, sequela  Closed fracture of proximal end of right humerus, unspecified fracture morphology, sequela  Remains NWB on LUE, full weight bearing on RUE. She will need repeat xrays and follow up around 10/7/23. She has been doing well with a hemiwalker. Provider advises her to wean down on the hydromorphone fairly quickly when she  gets home. She asks about taking ibuprofen in order to do this. Provider places ordered today and educates her to take it with food and avoid drinking alcohol while taking ibuprofen.    Chronic post-traumatic stress disorder (PTSD)  No behavioral concerns while at TCU. She was very cooperative with therapy and all cares.       Discharge Medications:  Current Outpatient Medications   Medication Sig Dispense Refill    albuterol (PROAIR HFA/PROVENTIL HFA/VENTOLIN HFA) 108 (90 Base) MCG/ACT inhaler Inhale 2 puffs into the lungs every 4 hours as needed      budesonide-formoterol (SYMBICORT) 160-4.5 MCG/ACT Inhaler Inhale 1 puff into the lungs two times daily      DULoxetine (CYMBALTA) 60 MG capsule Take 60 mg by mouth daily      folic acid (FOLVITE) 1 MG tablet Take 1 mg by mouth daily      HYDROmorphone (DILAUDID) 2 MG tablet Take 1-2 tablets (2-4 mg) by mouth every 4 hours as needed for pain 2mg pain 4-6 and 4mg pain 7-10 120 tablet 0    ibuprofen (ADVIL/MOTRIN) 200 MG tablet Take 400 mg by mouth every 6 hours as needed for pain      levothyroxine (SYNTHROID/LEVOTHROID) 50 MCG tablet Take 50 mcg by mouth daily      magnesium chloride 535 (64 Mg) MG TBEC CR tablet Take 2 tablets by mouth daily      menthol-zinc oxide (CALMOSEPTINE) 0.44-20.6 % OINT ointment Apply topically daily      polyethylene glycol 3350 POWD Take 17 g by mouth daily      senna (SENOKOT) 8.6 MG tablet Take 8.6 mg by mouth 2 times daily as needed      tiZANidine (ZANAFLEX) 2 MG tablet Take 2 mg by mouth 2 times daily AM and HS.  And once daily PRN pain      traZODone (DESYREL) 100 MG tablet Take 100 mg by mouth At Bedtime      Vitamin D3 (CHOLECALCIFEROL) 25 mcg (1000 units) tablet Take 1,000 Units by mouth daily          Controlled medications:   Only the remaining supply of hydromorphone will be sent with patient, do not recommend refill     Past Medical History: No past medical history on file.  Physical Exam:   Vitals: /85   Pulse 96    "Temp 96.8  F (36  C)   Resp 16   Ht 1.6 m (5' 3\")   Wt 45.5 kg (100 lb 3.2 oz)   SpO2 96%   BMI 17.75 kg/m    BMI: Body mass index is 17.75 kg/m .  GENERAL APPEARANCE:  Alert, in no distress, thin  ENT:  Mouth and posterior oropharynx normal, moist mucous membranes  EYES:  EOM normal, conjunctiva and lids normal  RESP:  no respiratory distress  PSYCH:  memory impaired , affect and mood normal     SNF labs: Labs done in SNF are in [facility] EPIC. Please refer to them using EPIC/Care Everywhere.    DISCHARGE PLAN:  Follow up labs: No labs orders/due  Medical Follow Up:      Follow up with primary care provider in 2-3 weeks  Follow up with ortho in 1-2 weeks  Discharge Services: Home Care:  Occupational Therapy and Physical Therapy      TOTAL DISCHARGE TIME:   Less than or equal to 30 minutes  Electronically signed by:  ANA Peterson CNP       Documentation of Face to Face and Certification for Home Health Services    I certify that services are/were furnished while this patient was under the care of a physician and that a physician or an allowed non-physician practitioner (NPP), had a face-to-face encounter that meets the physician face-to-face encounter requirements. The encounter was in whole, or in part, related to the primary reason for home health. The patient is confined to his/her home and needs intermittent skilled nursing, physical therapy, speech-language pathology, or the continued need for occupational therapy. A plan of care has been established by a physician and is periodically reviewed by a physician.  Date of Face-to-Face Encounter: 9/27/2023.    I certify that, based on my findings, the following services are medically necessary home health services: Occupational Therapy and Physical Therapy.    My clinical findings support the need for the above skilled services because: Requires assistance of another person or specialized equipment to access medical services because patient: Is prone to " wander/get lost without assistance..    Patient to re-establish plan of care with their PCP within 7-10 days after leaving the facility to reestablish care.  Medicare certified PECOS provider: ANA Peterson CNP  Date: September 27, 2023

## 2023-09-27 NOTE — LETTER
9/27/2023        RE: Terrie Mendez  [address]   [address]  [place] [address]        [facility] GERIATRICS DISCHARGE SUMMARY  PATIENT'S NAME: Terrie Mendez  YOB: 1960  MEDICAL RECORD NUMBER:  [id number]  Place of Service where encounter took place:  [facility] ([facility]) [[id number]]    PRIMARY CARE PROVIDER AND CLINIC RESPONSIBLE AFTER TRANSFER:   Maria Da Silva NP   Non-FMG Provider     Transferring providers: ANA Peterson CNP, Maryjo Bernabe MD  Recent Hospitalization/ED:  Hospital  [facility] stay 8/14/23 to 8/18/23.  Date of SNF Admission: August 18, 2023  Date of SNF (anticipated) Discharge: September 30, 2023  Discharged to: previous independent home  Cognitive Scores: SLUMS: 23/30  Physical Function: Ambulating 150 ft with hemiwalker  DME: hemiwalker    CODE STATUS/ADVANCE DIRECTIVES DISCUSSION:  Full Code   ALLERGIES: Patient has no known allergies.    NURSING FACILITY COURSE   Medication Changes/Rationale:   Ibuprofen added today    Summary of nursing facility stay:   Terrie Mendez  is a 62 year old  (1960), admitted to the above facility from  [facility]. Hospital stay 8/14/23 through 8/18/23. Patient with PMH osteoporosis, right femur fracture 1/18/23, alcohol use disorder, depression, PTSD, malnutrition, insomnia, and hypothyroidism. She was seen in the ED 8/10/23 after a fall, found to have a left proximal humerus fracture. Treated non-operatively. She declined TCU and discharged home. On 8/14/23 she presented after another fall, now diagnosed with right humerus fracture. On 8/15/23 she underwent IM nailing of the right fracture.     Closed fracture of proximal end of left humerus, unspecified fracture morphology, sequela  Closed fracture of proximal end of right humerus, unspecified fracture morphology, sequela  Remains NWB on LUE, full weight bearing on RUE. She will need repeat xrays and follow up around 10/7/23. She has been doing  well with a hemiwalker. Provider advises her to wean down on the hydromorphone fairly quickly when she gets home. She asks about taking ibuprofen in order to do this. Provider places ordered today and educates her to take it with food and avoid drinking alcohol while taking ibuprofen.    Chronic post-traumatic stress disorder (PTSD)  No behavioral concerns while at TCU. She was very cooperative with therapy and all cares.       Discharge Medications:  Current Outpatient Medications   Medication Sig Dispense Refill     albuterol (PROAIR HFA/PROVENTIL HFA/VENTOLIN HFA) 108 (90 Base) MCG/ACT inhaler Inhale 2 puffs into the lungs every 4 hours as needed       budesonide-formoterol (SYMBICORT) 160-4.5 MCG/ACT Inhaler Inhale 1 puff into the lungs two times daily       DULoxetine (CYMBALTA) 60 MG capsule Take 60 mg by mouth daily       folic acid (FOLVITE) 1 MG tablet Take 1 mg by mouth daily       HYDROmorphone (DILAUDID) 2 MG tablet Take 1-2 tablets (2-4 mg) by mouth every 4 hours as needed for pain 2mg pain 4-6 and 4mg pain 7-10 120 tablet 0     ibuprofen (ADVIL/MOTRIN) 200 MG tablet Take 400 mg by mouth every 6 hours as needed for pain       levothyroxine (SYNTHROID/LEVOTHROID) 50 MCG tablet Take 50 mcg by mouth daily       magnesium chloride 535 (64 Mg) MG TBEC CR tablet Take 2 tablets by mouth daily       menthol-zinc oxide (CALMOSEPTINE) 0.44-20.6 % OINT ointment Apply topically daily       polyethylene glycol 3350 POWD Take 17 g by mouth daily       senna (SENOKOT) 8.6 MG tablet Take 8.6 mg by mouth 2 times daily as needed       tiZANidine (ZANAFLEX) 2 MG tablet Take 2 mg by mouth 2 times daily AM and HS.  And once daily PRN pain       traZODone (DESYREL) 100 MG tablet Take 100 mg by mouth At Bedtime       Vitamin D3 (CHOLECALCIFEROL) 25 mcg (1000 units) tablet Take 1,000 Units by mouth daily          Controlled medications:   Only the remaining supply of hydromorphone will be sent with patient, do not recommend  "refill     Past Medical History: No past medical history on file.  Physical Exam:   Vitals: /85   Pulse 96   Temp 96.8  F (36  C)   Resp 16   Ht 1.6 m (5' 3\")   Wt 45.5 kg (100 lb 3.2 oz)   SpO2 96%   BMI 17.75 kg/m    BMI: Body mass index is 17.75 kg/m .  GENERAL APPEARANCE:  Alert, in no distress, thin  ENT:  Mouth and posterior oropharynx normal, moist mucous membranes  EYES:  EOM normal, conjunctiva and lids normal  RESP:  no respiratory distress  PSYCH:  memory impaired , affect and mood normal     SNF labs: Labs done in SNF are in [facility] EPIC. Please refer to them using [facility]/mymxlog Everywhere.    DISCHARGE PLAN:  Follow up labs: No labs orders/due  Medical Follow Up:      Follow up with primary care provider in 2-3 weeks  Follow up with ortho in 1-2 weeks  Discharge Services: Home Care:  Occupational Therapy and Physical Therapy      TOTAL DISCHARGE TIME:   Less than or equal to 30 minutes  Electronically signed by:  ANA Peterson CNP       Documentation of Face to Face and Certification for Home Health Services    I certify that services are/were furnished while this patient was under the care of a physician and that a physician or an allowed non-physician practitioner (NPP), had a face-to-face encounter that meets the physician face-to-face encounter requirements. The encounter was in whole, or in part, related to the primary reason for home health. The patient is confined to his/her home and needs intermittent skilled nursing, physical therapy, speech-language pathology, or the continued need for occupational therapy. A plan of care has been established by a physician and is periodically reviewed by a physician.  Date of Face-to-Face Encounter: 9/27/2023.    I certify that, based on my findings, the following services are medically necessary home health services: Occupational Therapy and Physical Therapy.    My clinical findings support the need for the above skilled services because: " Requires assistance of another person or specialized equipment to access medical services because patient: Is prone to wander/get lost without assistance..    Patient to re-establish plan of care with their PCP within 7-10 days after leaving the facility to reestablish care.  Medicare certified PECOS provider: ANA Peterson CNP  Date: September 27, 2023                    Sincerely,        ANA Peterson CNP